# Patient Record
Sex: FEMALE | Race: WHITE | NOT HISPANIC OR LATINO | Employment: OTHER | URBAN - NONMETROPOLITAN AREA
[De-identification: names, ages, dates, MRNs, and addresses within clinical notes are randomized per-mention and may not be internally consistent; named-entity substitution may affect disease eponyms.]

---

## 2018-03-09 ENCOUNTER — TRANSCRIBE ORDERS (OUTPATIENT)
Dept: ADMINISTRATIVE | Facility: HOSPITAL | Age: 73
End: 2018-03-09

## 2018-03-09 DIAGNOSIS — Z78.0 ASYMPTOMATIC MENOPAUSAL STATE: ICD-10-CM

## 2018-03-09 DIAGNOSIS — Z12.31 ENCOUNTER FOR SCREENING MAMMOGRAM FOR MALIGNANT NEOPLASM OF BREAST: Primary | ICD-10-CM

## 2018-03-14 ENCOUNTER — HOSPITAL ENCOUNTER (OUTPATIENT)
Dept: MAMMOGRAPHY | Facility: HOSPITAL | Age: 73
Discharge: HOME | End: 2018-03-14
Attending: INTERNAL MEDICINE | Admitting: INTERNAL MEDICINE

## 2018-03-14 ENCOUNTER — HOSPITAL ENCOUNTER (OUTPATIENT)
Dept: BONE DENSITY | Facility: HOSPITAL | Age: 73
Discharge: HOME | End: 2018-03-14
Attending: INTERNAL MEDICINE

## 2018-03-14 DIAGNOSIS — Z78.0 ASYMPTOMATIC MENOPAUSAL STATE: ICD-10-CM

## 2018-03-14 DIAGNOSIS — Z12.31 ENCOUNTER FOR SCREENING MAMMOGRAM FOR MALIGNANT NEOPLASM OF BREAST: ICD-10-CM

## 2018-03-14 PROCEDURE — 77067 SCR MAMMO BI INCL CAD: CPT

## 2018-03-14 PROCEDURE — 77063 BREAST TOMOSYNTHESIS BI: CPT

## 2018-03-14 PROCEDURE — 77080 DXA BONE DENSITY AXIAL: CPT

## 2018-03-16 RX ORDER — PRAVASTATIN SODIUM 10 MG
10 TABLET ORAL DAILY
COMMUNITY

## 2018-03-16 RX ORDER — LISINOPRIL 40 MG/1
40 TABLET ORAL DAILY
COMMUNITY

## 2018-03-16 RX ORDER — TIZANIDINE HYDROCHLORIDE 4 MG/1
4 CAPSULE, GELATIN COATED ORAL 3 TIMES DAILY
COMMUNITY
End: 2020-01-15

## 2018-03-16 RX ORDER — AMLODIPINE BESYLATE 10 MG/1
10 TABLET ORAL DAILY
COMMUNITY

## 2018-03-27 ENCOUNTER — TELEPHONE (OUTPATIENT)
Dept: GASTROENTEROLOGY | Age: 73
End: 2018-03-27

## 2018-03-27 NOTE — TELEPHONE ENCOUNTER
This EP was referred to us by Dr Saritha Jackson for an Open Access Colon Screen; Called and spoke with patient regarding criteria for Open Access; Patient does qualify; Patient is scheduled for an OA Colonoscopy on 4/26/18 @ 10:15 am w/ Dr Tona Cowan at [facility]. Informed patient that they will need a  the day of the procedure as they will be sedated. Patient voiced understanding; Went over prep information and mailed Prep instructions to the patient.  Thank you for your referral. Slim mckay

## 2020-01-15 ENCOUNTER — HOSPITAL ENCOUNTER (INPATIENT)
Age: 75
LOS: 2 days | Discharge: HOME | DRG: 379 | End: 2020-01-18
Attending: EMERGENCY MEDICINE | Admitting: HOSPITALIST
Payer: MEDICARE

## 2020-01-15 LAB
ALBUMIN SERPL-MCNC: 4.3 G/DL (ref 3.5–5.2)
ALP BLD-CCNC: 63 U/L (ref 35–104)
ALT SERPL-CCNC: 11 U/L (ref 5–33)
ANION GAP SERPL CALCULATED.3IONS-SCNC: 16 MMOL/L (ref 7–19)
APTT: 25.3 SEC (ref 26–36.2)
AST SERPL-CCNC: 16 U/L (ref 5–32)
BASOPHILS ABSOLUTE: 0.1 K/UL (ref 0–0.2)
BASOPHILS RELATIVE PERCENT: 0.5 % (ref 0–1)
BILIRUB SERPL-MCNC: <0.2 MG/DL (ref 0.2–1.2)
BUN BLDV-MCNC: 35 MG/DL (ref 8–23)
CALCIUM SERPL-MCNC: 9.6 MG/DL (ref 8.8–10.2)
CHLORIDE BLD-SCNC: 100 MMOL/L (ref 98–111)
CO2: 22 MMOL/L (ref 22–29)
CREAT SERPL-MCNC: 0.7 MG/DL (ref 0.5–0.9)
EOSINOPHILS ABSOLUTE: 0.1 K/UL (ref 0–0.6)
EOSINOPHILS RELATIVE PERCENT: 0.6 % (ref 0–5)
GFR NON-AFRICAN AMERICAN: >60
GLUCOSE BLD-MCNC: 163 MG/DL (ref 74–109)
HCT VFR BLD CALC: 41.7 % (ref 37–47)
HEMOGLOBIN: 13.3 G/DL (ref 12–16)
IMMATURE GRANULOCYTES #: 0.1 K/UL
INR BLD: 1.09 (ref 0.88–1.18)
LYMPHOCYTES ABSOLUTE: 2 K/UL (ref 1.1–4.5)
LYMPHOCYTES RELATIVE PERCENT: 14.5 % (ref 20–40)
MCH RBC QN AUTO: 29.9 PG (ref 27–31)
MCHC RBC AUTO-ENTMCNC: 31.9 G/DL (ref 33–37)
MCV RBC AUTO: 93.7 FL (ref 81–99)
MONOCYTES ABSOLUTE: 0.9 K/UL (ref 0–0.9)
MONOCYTES RELATIVE PERCENT: 6.8 % (ref 0–10)
NEUTROPHILS ABSOLUTE: 10.7 K/UL (ref 1.5–7.5)
NEUTROPHILS RELATIVE PERCENT: 77.2 % (ref 50–65)
PDW BLD-RTO: 14.6 % (ref 11.5–14.5)
PLATELET # BLD: 329 K/UL (ref 130–400)
PMV BLD AUTO: 9.1 FL (ref 9.4–12.3)
POTASSIUM SERPL-SCNC: 4.9 MMOL/L (ref 3.5–5)
PROTHROMBIN TIME: 13.5 SEC (ref 12–14.6)
RBC # BLD: 4.45 M/UL (ref 4.2–5.4)
SODIUM BLD-SCNC: 138 MMOL/L (ref 136–145)
TOTAL PROTEIN: 7.2 G/DL (ref 6.6–8.7)
WBC # BLD: 13.9 K/UL (ref 4.8–10.8)

## 2020-01-15 PROCEDURE — 80053 COMPREHEN METABOLIC PANEL: CPT

## 2020-01-15 PROCEDURE — 36415 COLL VENOUS BLD VENIPUNCTURE: CPT

## 2020-01-15 PROCEDURE — 85025 COMPLETE CBC W/AUTO DIFF WBC: CPT

## 2020-01-15 PROCEDURE — 99284 EMERGENCY DEPT VISIT MOD MDM: CPT

## 2020-01-15 PROCEDURE — 85610 PROTHROMBIN TIME: CPT

## 2020-01-15 PROCEDURE — 85730 THROMBOPLASTIN TIME PARTIAL: CPT

## 2020-01-15 RX ORDER — OXYBUTYNIN CHLORIDE 5 MG/1
5 TABLET ORAL 3 TIMES DAILY
COMMUNITY

## 2020-01-16 PROBLEM — K92.2 LOWER GASTROINTESTINAL BLEED: Status: ACTIVE | Noted: 2020-01-16

## 2020-01-16 PROBLEM — Z86.718 HISTORY OF DVT (DEEP VEIN THROMBOSIS): Chronic | Status: RESOLVED | Noted: 2020-01-16 | Resolved: 2020-01-16

## 2020-01-16 PROBLEM — E78.5 HYPERLIPIDEMIA: Chronic | Status: ACTIVE | Noted: 2020-01-16

## 2020-01-16 LAB
ABO/RH: NORMAL
ANION GAP SERPL CALCULATED.3IONS-SCNC: 10 MMOL/L (ref 7–19)
ANTIBODY SCREEN: NORMAL
BASOPHILS ABSOLUTE: 0 K/UL (ref 0–0.2)
BASOPHILS RELATIVE PERCENT: 0.3 % (ref 0–1)
BUN BLDV-MCNC: 29 MG/DL (ref 8–23)
CALCIUM SERPL-MCNC: 8.8 MG/DL (ref 8.8–10.2)
CHLORIDE BLD-SCNC: 102 MMOL/L (ref 98–111)
CO2: 24 MMOL/L (ref 22–29)
CREAT SERPL-MCNC: 0.5 MG/DL (ref 0.5–0.9)
EOSINOPHILS ABSOLUTE: 0.1 K/UL (ref 0–0.6)
EOSINOPHILS RELATIVE PERCENT: 0.9 % (ref 0–5)
GFR NON-AFRICAN AMERICAN: >60
GLUCOSE BLD-MCNC: 124 MG/DL (ref 74–109)
HCT VFR BLD CALC: 32.3 % (ref 37–47)
HCT VFR BLD CALC: 32.8 % (ref 37–47)
HCT VFR BLD CALC: 34 % (ref 37–47)
HEMOGLOBIN: 10.2 G/DL (ref 12–16)
HEMOGLOBIN: 10.7 G/DL (ref 12–16)
HEMOGLOBIN: 10.9 G/DL (ref 12–16)
IMMATURE GRANULOCYTES #: 0 K/UL
LYMPHOCYTES ABSOLUTE: 2.6 K/UL (ref 1.1–4.5)
LYMPHOCYTES RELATIVE PERCENT: 27.6 % (ref 20–40)
MCH RBC QN AUTO: 29.6 PG (ref 27–31)
MCHC RBC AUTO-ENTMCNC: 32.1 G/DL (ref 33–37)
MCV RBC AUTO: 92.4 FL (ref 81–99)
MONOCYTES ABSOLUTE: 0.7 K/UL (ref 0–0.9)
MONOCYTES RELATIVE PERCENT: 7.7 % (ref 0–10)
NEUTROPHILS ABSOLUTE: 6 K/UL (ref 1.5–7.5)
NEUTROPHILS RELATIVE PERCENT: 63.2 % (ref 50–65)
PDW BLD-RTO: 14.6 % (ref 11.5–14.5)
PLATELET # BLD: 273 K/UL (ref 130–400)
PMV BLD AUTO: 9.1 FL (ref 9.4–12.3)
POTASSIUM REFLEX MAGNESIUM: 4.5 MMOL/L (ref 3.5–5)
RBC # BLD: 3.68 M/UL (ref 4.2–5.4)
SODIUM BLD-SCNC: 136 MMOL/L (ref 136–145)
WBC # BLD: 9.6 K/UL (ref 4.8–10.8)

## 2020-01-16 PROCEDURE — 80048 BASIC METABOLIC PNL TOTAL CA: CPT

## 2020-01-16 PROCEDURE — 2580000003 HC RX 258: Performed by: EMERGENCY MEDICINE

## 2020-01-16 PROCEDURE — 86901 BLOOD TYPING SEROLOGIC RH(D): CPT

## 2020-01-16 PROCEDURE — 85018 HEMOGLOBIN: CPT

## 2020-01-16 PROCEDURE — 99221 1ST HOSP IP/OBS SF/LOW 40: CPT | Performed by: INTERNAL MEDICINE

## 2020-01-16 PROCEDURE — 96361 HYDRATE IV INFUSION ADD-ON: CPT

## 2020-01-16 PROCEDURE — 86900 BLOOD TYPING SEROLOGIC ABO: CPT

## 2020-01-16 PROCEDURE — 36415 COLL VENOUS BLD VENIPUNCTURE: CPT

## 2020-01-16 PROCEDURE — 85014 HEMATOCRIT: CPT

## 2020-01-16 PROCEDURE — 96360 HYDRATION IV INFUSION INIT: CPT

## 2020-01-16 PROCEDURE — 85025 COMPLETE CBC W/AUTO DIFF WBC: CPT

## 2020-01-16 PROCEDURE — 97161 PT EVAL LOW COMPLEX 20 MIN: CPT

## 2020-01-16 PROCEDURE — 86850 RBC ANTIBODY SCREEN: CPT

## 2020-01-16 PROCEDURE — 1210000000 HC MED SURG R&B

## 2020-01-16 PROCEDURE — 6370000000 HC RX 637 (ALT 250 FOR IP): Performed by: HOSPITALIST

## 2020-01-16 PROCEDURE — 2580000003 HC RX 258: Performed by: INTERNAL MEDICINE

## 2020-01-16 RX ORDER — PRAVASTATIN SODIUM 10 MG
10 TABLET ORAL DAILY
Status: DISCONTINUED | OUTPATIENT
Start: 2020-01-16 | End: 2020-01-18 | Stop reason: HOSPADM

## 2020-01-16 RX ORDER — SODIUM CHLORIDE 0.9 % (FLUSH) 0.9 %
10 SYRINGE (ML) INJECTION EVERY 12 HOURS SCHEDULED
Status: DISCONTINUED | OUTPATIENT
Start: 2020-01-16 | End: 2020-01-18 | Stop reason: HOSPADM

## 2020-01-16 RX ORDER — OXYBUTYNIN CHLORIDE 5 MG/1
5 TABLET ORAL 3 TIMES DAILY
Status: DISCONTINUED | OUTPATIENT
Start: 2020-01-16 | End: 2020-01-18 | Stop reason: HOSPADM

## 2020-01-16 RX ORDER — SODIUM CHLORIDE 0.9 % (FLUSH) 0.9 %
10 SYRINGE (ML) INJECTION PRN
Status: DISCONTINUED | OUTPATIENT
Start: 2020-01-16 | End: 2020-01-18 | Stop reason: HOSPADM

## 2020-01-16 RX ORDER — SODIUM CHLORIDE 0.9 % (FLUSH) 0.9 %
10 SYRINGE (ML) INJECTION EVERY 12 HOURS SCHEDULED
Status: DISCONTINUED | OUTPATIENT
Start: 2020-01-16 | End: 2020-01-16 | Stop reason: SDUPTHER

## 2020-01-16 RX ORDER — ACETAMINOPHEN 325 MG/1
650 TABLET ORAL EVERY 4 HOURS PRN
Status: DISCONTINUED | OUTPATIENT
Start: 2020-01-16 | End: 2020-01-18 | Stop reason: HOSPADM

## 2020-01-16 RX ORDER — SODIUM CHLORIDE, SODIUM LACTATE, POTASSIUM CHLORIDE, CALCIUM CHLORIDE 600; 310; 30; 20 MG/100ML; MG/100ML; MG/100ML; MG/100ML
INJECTION, SOLUTION INTRAVENOUS ONCE
Status: COMPLETED | OUTPATIENT
Start: 2020-01-16 | End: 2020-01-16

## 2020-01-16 RX ORDER — ONDANSETRON 2 MG/ML
4 INJECTION INTRAMUSCULAR; INTRAVENOUS EVERY 6 HOURS PRN
Status: DISCONTINUED | OUTPATIENT
Start: 2020-01-16 | End: 2020-01-18 | Stop reason: HOSPADM

## 2020-01-16 RX ORDER — SODIUM CHLORIDE, SODIUM LACTATE, POTASSIUM CHLORIDE, CALCIUM CHLORIDE 600; 310; 30; 20 MG/100ML; MG/100ML; MG/100ML; MG/100ML
INJECTION, SOLUTION INTRAVENOUS CONTINUOUS
Status: DISCONTINUED | OUTPATIENT
Start: 2020-01-16 | End: 2020-01-18 | Stop reason: HOSPADM

## 2020-01-16 RX ORDER — SODIUM CHLORIDE 0.9 % (FLUSH) 0.9 %
10 SYRINGE (ML) INJECTION PRN
Status: DISCONTINUED | OUTPATIENT
Start: 2020-01-16 | End: 2020-01-16 | Stop reason: SDUPTHER

## 2020-01-16 RX ORDER — FLUOXETINE HYDROCHLORIDE 20 MG/1
40 CAPSULE ORAL DAILY
Status: DISCONTINUED | OUTPATIENT
Start: 2020-01-16 | End: 2020-01-18 | Stop reason: HOSPADM

## 2020-01-16 RX ADMIN — OXYBUTYNIN CHLORIDE 5 MG: 5 TABLET ORAL at 19:25

## 2020-01-16 RX ADMIN — OXYBUTYNIN CHLORIDE 5 MG: 5 TABLET ORAL at 08:53

## 2020-01-16 RX ADMIN — SODIUM CHLORIDE, PRESERVATIVE FREE 10 ML: 5 INJECTION INTRAVENOUS at 19:25

## 2020-01-16 RX ADMIN — SODIUM CHLORIDE, POTASSIUM CHLORIDE, SODIUM LACTATE AND CALCIUM CHLORIDE: 600; 310; 30; 20 INJECTION, SOLUTION INTRAVENOUS at 19:25

## 2020-01-16 RX ADMIN — SODIUM CHLORIDE, POTASSIUM CHLORIDE, SODIUM LACTATE AND CALCIUM CHLORIDE: 600; 310; 30; 20 INJECTION, SOLUTION INTRAVENOUS at 00:48

## 2020-01-16 RX ADMIN — FLUOXETINE HYDROCHLORIDE 40 MG: 20 CAPSULE ORAL at 08:54

## 2020-01-16 RX ADMIN — OXYBUTYNIN CHLORIDE 5 MG: 5 TABLET ORAL at 14:25

## 2020-01-16 RX ADMIN — PRAVASTATIN SODIUM 10 MG: 10 TABLET ORAL at 08:53

## 2020-01-16 RX ADMIN — SODIUM CHLORIDE, POTASSIUM CHLORIDE, SODIUM LACTATE AND CALCIUM CHLORIDE: 600; 310; 30; 20 INJECTION, SOLUTION INTRAVENOUS at 12:11

## 2020-01-16 ASSESSMENT — ENCOUNTER SYMPTOMS
CONSTIPATION: 0
CHEST TIGHTNESS: 0
BACK PAIN: 0
DIARRHEA: 0
TROUBLE SWALLOWING: 0
RHINORRHEA: 0
ANAL BLEEDING: 1
COUGH: 0
SHORTNESS OF BREATH: 0
ABDOMINAL DISTENTION: 0
SORE THROAT: 0
COLOR CHANGE: 0
EYE PAIN: 0
PHOTOPHOBIA: 0
ABDOMINAL PAIN: 0
VOMITING: 1
NAUSEA: 0
WHEEZING: 0

## 2020-01-16 NOTE — H&P
ADMITTED: [id number]    PCP:  Guadalupe Wood MD    CODE STATUS: Full Code  Health Care Proxy: her , Mr. Marivel Menendez, [phone]        SUBJECTIVE:    Chief Complaint   Patient presents with    Rectal Bleeding     Pt to ED wtih c/o rectal bleeding x4 hours      HPI and ROS:  Mrs. Angel Jennings is a pleasant 70year old  american lady from home. She states that she had at 5pm yesterday she went to the bath room and had req liquid for a stool. She states that between then and 8pm she went 5-8 times at home. She had two more bloody BMs in the ED. She states that it is getting better as she has only had two since 8 or 9 o'clock last night. She has been taking aleve 1-2 x a day \"for a butt muscle. \" She states \"that is all, I don't know anything else. \"  She had endorsed to the EP that she had been taking aleve in the past and had dveloped a lower GIB and was told not to use it anymore. She had started again and has been using regularly for pain. She has confirmed these details. She also endorses: intermittent dyschezia, and intermittent constipation. She also denies: fever, chills, night sweats, weakness, fatigue, malaise, dyschezia, pencils stools, dysuria, cough, and sneeze. Past Medical History:   Diagnosis Date    History of DVT (deep vein thrombosis)     Hyperlipidemia     Hypertension     Osteoarthritis     Sarcocystosis     Type II or unspecified type diabetes mellitus without mention of complication, not stated as uncontrolled      Past Psychiatric History:  Denies any    Past Surgical History:   Procedure Laterality Date    CARPAL TUNNEL RELEASE      COLONOSCOPY  2005? [facility] Roles  4/2012    Dr. Albina Sifuentes ARTHROSCOPY      KNEE SURGERY      Right total knee replacement    TONSILLECTOMY      TUBAL LIGATION      UPPER GASTROINTESTINAL ENDOSCOPY  2005?     [facility] Dr Alexx Lira Social History     Tobacco History     Smoking Status  Former Smoker Quit date  3/21/1972 Smoking Tobacco Type  Cigarettes    Smokeless Tobacco Use  Never Used          Alcohol History     Alcohol Use Status  No          Drug Use     Drug Use Status  No          Sexual Activity     Sexually Active  Not Asked            CODE STATUS: Full Code  Health Care Proxy: her , Mr. Antonina Carrillo, [phone]  AMBULATES: independently  DOMICILED: in private home, has stairs but barely ever uses, lives alone with the , has outside cats    Family History:  Deferred    Allergies:  No Known Allergies    Current Facility-Administered Medications   Medication Dose Route Frequency Provider Last Rate Last Dose    sodium chloride flush 0.9 % injection 10 mL  10 mL Intravenous 2 times per day Geneva Russell MD        sodium chloride flush 0.9 % injection 10 mL  10 mL Intravenous PRN Geneva Russell MD        acetaminophen (TYLENOL) tablet 650 mg  650 mg Oral Q4H PRN Geneva Russell MD        ondansetron [facility]) injection 4 mg  4 mg Intravenous Q6H PRN Geneva Russell MD        pravastatin (PRAVACHOL) tablet 10 mg  10 mg Oral Daily Geneva Russell MD        oxybutynin [facility]) tablet 5 mg  5 mg Oral TID Geneva Russell MD        FLUoxetine (PROZAC) capsule 40 mg  40 mg Oral Daily Geneva Russell MD        lactated ringers infusion   Intravenous Continuous Geneva Russell MD         Current Outpatient Medications   Medication Sig Dispense Refill    oxybutynin (DITROPAN) 5 MG tablet Take 5 mg by mouth 3 times daily      amLODIPine (NORVASC) 10 MG tablet Take 10 mg by mouth daily      lisinopril (PRINIVIL;ZESTRIL) 40 MG tablet Take 40 mg by mouth daily      pravastatin (PRAVACHOL) 10 MG tablet Take 10 mg by mouth daily      Ergocalciferol (VITAMIN D2 PO) Take by mouth      FLUOXETINE HCL PO Take 40 mg by mouth daily            OBJECTIVE:    Vitals:    01/16/20 0130   BP: 128/64   Pulse: 80   Resp: 20

## 2020-01-16 NOTE — ED PROVIDER NOTES
Negative for dizziness, weakness, light-headedness, numbness and headaches. Psychiatric/Behavioral: Negative for agitation, behavioral problems, confusion, hallucinations and suicidal ideas. PAST MEDICALHISTORY     Past Medical History:   Diagnosis Date    History of DVT (deep vein thrombosis)     Hyperlipidemia     Hypertension     Osteoarthritis     Sarcocystosis     Type II or unspecified type diabetes mellitus without mention of complication, not stated as uncontrolled          SURGICAL HISTORY       Past Surgical History:   Procedure Laterality Date    CARPAL TUNNEL RELEASE      COLONOSCOPY  2005? [facility] Dr Adria Kaiser  4/2012    Dr. Cleopatra Salmon ARTHROSCOPY      KNEE SURGERY      Right total knee replacement    TONSILLECTOMY      TUBAL LIGATION      UPPER GASTROINTESTINAL ENDOSCOPY  2005? [facility]  Unknown         CURRENT MEDICATIONS     Previous Medications    AMLODIPINE (NORVASC) 10 MG TABLET    Take 10 mg by mouth daily    ERGOCALCIFEROL (VITAMIN D2 PO)    Take by mouth    FLUOXETINE HCL PO    Take 40 mg by mouth daily     LISINOPRIL (PRINIVIL;ZESTRIL) 40 MG TABLET    Take 40 mg by mouth daily    OXYBUTYNIN (DITROPAN) 5 MG TABLET    Take 5 mg by mouth 3 times daily    PRAVASTATIN (PRAVACHOL) 10 MG TABLET    Take 10 mg by mouth daily       ALLERGIES     Patient has no known allergies. FAMILY HISTORY     History reviewed. No pertinent family history.        SOCIAL HISTORY       Social History     Socioeconomic History    Marital status:      Spouse name: None    Number of children: None    Years of education: None    Highest education level: None   Occupational History    None   Social Needs    Financial resource strain: None    Food insecurity:     Worry: None     Inability: None    Transportation needs:     Medical: None     Non-medical: None   Tobacco Use    Smoking status: Former

## 2020-01-16 NOTE — CONSULTS
medications    Medication Sig Start Date End Date Taking?  Authorizing Provider   oxybutynin (DITROPAN) 5 MG tablet Take 5 mg by mouth 3 times daily   Yes Historical Provider, MD   amLODIPine (NORVASC) 10 MG tablet Take 10 mg by mouth daily   Yes Historical Provider, MD   lisinopril (PRINIVIL;ZESTRIL) 40 MG tablet Take 40 mg by mouth daily   Yes Historical Provider, MD   pravastatin (PRAVACHOL) 10 MG tablet Take 10 mg by mouth daily   Yes Historical Provider, MD   Ergocalciferol (VITAMIN D2 PO) Take by mouth   Yes Historical Provider, MD   FLUOXETINE HCL PO Take 40 mg by mouth daily    Yes Historical Provider, MD      Allergies:  Nsaids      Current Meds:      sodium chloride flush  10 mL Intravenous 2 times per day    pravastatin  10 mg Oral Daily    oxybutynin  5 mg Oral TID    FLUoxetine  40 mg Oral Daily        lactated ringers         PRN Meds:  sodium chloride flush, acetaminophen, ondansetron        ROS:  ROS NEGATIVE EXCEPT THOSE MARKED WITH AN \"X\"    GENERAL: [] Fevers, [] chills, [] generalized weakness, [] weight loss, []weight gain, [] anorexia  Skin/Breast: [] jaundice, [] new rashes, [] itching   Eyes/Ears/Nose/Mouth/Throat: [] change in vision, [] double vision, [] light headiness, [] vertigo  CARDIOVASCULAR: [] chest pain, [] palpitations, [] syncope, [] dyspnea on exertion, [] orthopnea  RESPIRATORY: [] SOB, [] cough, [] wheezing, [] hemoptysis  GI: [] dark stools, [x] bloody stools, [x] BRBPR, [] abdominal pain, [] GERD like symptoms, [] nausea, [] vomiting, [] hematemesis, [] jaundice, [] constipation, [] diarrhea, [] hemorrhoids, [] change in bowel habits, [] bowel incontinence  : [] Dysuria, [] urgency, [] frequency, [] change in urine color, [] discharge  MUSCULOSKELETAL: [] muscle pain, [] muscle swelling, [] joint pain, [] muscle weakness  Neurological/Psychiatric: [] Sensory disturbances, [] motor disturbances, [] difficulty with speech, [] paresthesias, [] paralysis, [] depression, [] anxiety   Allergy/Immunological/Lymphatic/Endocrine: [x] anemia, [] rashes, [] polyuria, [] polydypsia      Physical Exam:  Vitals:    01/16/20 0026 01/16/20 0130 01/16/20 0307 01/16/20 0723   BP: 115/78 128/64 132/88 (!) 150/72   Pulse: 82 80 78 72   Resp: 20 20 20 18   Temp:   98 °F (36.7 °C) 97.3 °F (36.3 °C)   TempSrc:    Temporal   SpO2: 94% 95% 95% 92%   Weight:       Height:           Constitutional: [x] NAD, [x] of stated age, [x] well nourished  Eyes: [x] conjunctiva clear, [x] Non inflamed irises, [x] no scleral icterus  ENT/Mouth: [x]  Nares patent with pink mucosa, [x] oropharynx clear without exudates or erythema, [x] hearing grossly normal  Head/Neck: [x] symmetrical, [x] supple  Lungs: [x] respirations non labored with good effort, [x] CTA bilaterally, [x] no respiratory distress  Heart: [x] normal S1S2, [x]  Regular rate, [x]  No murmurs, [x] pedal pulses preserved 2/4 bilaterally  Abdomen: [x] +BSx4, [x] NTND, [x] soft, [x] no guarding, [x] no peritoneal signs  Muscuoskeletal: [x] Normal gait and station, [x]  Normal nails and digits bilaterally, [x] no muscle atrophy  Skin/SubQ: [x] No jaundice, [x] warm, dry skin, [x] no rashes on inspection  Neurologic: [x]  Sensation grossly intact, [x] no slurred speech, [x]  No focal deficits  Psychiatric: [x]  Orientated to person, place, and time; [x] mood and affect unremarkable, [x] memory recent and remote intact      Labs:     Recent Labs     01/15/20  2227 01/16/20  0413 01/16/20  0745   WBC 13.9* 9.6  --    RBC 4.45 3.68*  --    HGB 13.3 10.9* 10.7*   HCT 41.7 34.0* 32.8*   MCV 93.7 92.4  --    MCH 29.9 29.6  --    MCHC 31.9* 32.1*  --     273  --      Recent Labs     01/15/20  2227 01/16/20  0413    136   K 4.9 4.5   ANIONGAP 16 10    102   CO2 22 24   BUN 35* 29*   CREATININE 0.7 0.5   GLUCOSE 163* 124*   CALCIUM 9.6 8.8     No results for input(s): MG, PHOS in the last 72 hours.   Recent Labs     01/15/20  2227   AST 16   ALT 11 BILITOT <0.2   ALKPHOS 63     HgBA1c:  No components found for: HGBA1C  FLP:  No results found for: TRIG, HDL, LDLCALC, LDLDIRECT, LABVLDL  TSH:  No results found for: TSH  Troponin T: No results for input(s): TROPONINI in the last 72 hours. INR:   Recent Labs     01/15/20  2227   INR 1.09       No results for input(s): LIPASE, AMYLASE in the last 72 hours. Radiology:  No results found. Assessment:  1. Rectal bleeding  2. Anemia  3. Elevated BUN  4. NSAID use    Plan:  - Pt has a previous history of lower GI bleeding when taking NSAIDs. She was taking aleve daily for pain and yesterday began to have BRBPR. The bleeding has stopped and she is hemodynamically stable today. IV fluids decreased to 100cc/hr. Since her BUN is elevated along with NSAIDs use, would recommend both an EGD/colonoscopy tomorrow. Pt has agreed to these procedures and will place on clear liquid diet today. NPO after midnight.     Hillary Peña,

## 2020-01-17 LAB
ANION GAP SERPL CALCULATED.3IONS-SCNC: 13 MMOL/L (ref 7–19)
BASOPHILS ABSOLUTE: 0.1 K/UL (ref 0–0.2)
BASOPHILS RELATIVE PERCENT: 0.7 % (ref 0–1)
BUN BLDV-MCNC: 12 MG/DL (ref 8–23)
CALCIUM SERPL-MCNC: 8.9 MG/DL (ref 8.8–10.2)
CHLORIDE BLD-SCNC: 104 MMOL/L (ref 98–111)
CO2: 24 MMOL/L (ref 22–29)
CREAT SERPL-MCNC: <0.5 MG/DL (ref 0.5–0.9)
EOSINOPHILS ABSOLUTE: 0.1 K/UL (ref 0–0.6)
EOSINOPHILS RELATIVE PERCENT: 1.8 % (ref 0–5)
GFR NON-AFRICAN AMERICAN: >60
GLUCOSE BLD-MCNC: 91 MG/DL (ref 74–109)
HCT VFR BLD CALC: 30.8 % (ref 37–47)
HCT VFR BLD CALC: 31.1 % (ref 37–47)
HCT VFR BLD CALC: 33.7 % (ref 37–47)
HEMOGLOBIN: 10.1 G/DL (ref 12–16)
HEMOGLOBIN: 11 G/DL (ref 12–16)
HEMOGLOBIN: 9.9 G/DL (ref 12–16)
IMMATURE GRANULOCYTES #: 0 K/UL
LYMPHOCYTES ABSOLUTE: 2.7 K/UL (ref 1.1–4.5)
LYMPHOCYTES RELATIVE PERCENT: 36.3 % (ref 20–40)
MCH RBC QN AUTO: 30.2 PG (ref 27–31)
MCHC RBC AUTO-ENTMCNC: 32.5 G/DL (ref 33–37)
MCV RBC AUTO: 93.1 FL (ref 81–99)
MONOCYTES ABSOLUTE: 0.8 K/UL (ref 0–0.9)
MONOCYTES RELATIVE PERCENT: 10.2 % (ref 0–10)
NEUTROPHILS ABSOLUTE: 3.7 K/UL (ref 1.5–7.5)
NEUTROPHILS RELATIVE PERCENT: 50.7 % (ref 50–65)
PDW BLD-RTO: 14.5 % (ref 11.5–14.5)
PLATELET # BLD: 252 K/UL (ref 130–400)
PMV BLD AUTO: 9.5 FL (ref 9.4–12.3)
POTASSIUM REFLEX MAGNESIUM: 4.2 MMOL/L (ref 3.5–5)
RBC # BLD: 3.34 M/UL (ref 4.2–5.4)
SODIUM BLD-SCNC: 141 MMOL/L (ref 136–145)
WBC # BLD: 7.4 K/UL (ref 4.8–10.8)

## 2020-01-17 PROCEDURE — 2580000003 HC RX 258: Performed by: INTERNAL MEDICINE

## 2020-01-17 PROCEDURE — 85025 COMPLETE CBC W/AUTO DIFF WBC: CPT

## 2020-01-17 PROCEDURE — 6370000000 HC RX 637 (ALT 250 FOR IP): Performed by: INTERNAL MEDICINE

## 2020-01-17 PROCEDURE — 1210000000 HC MED SURG R&B

## 2020-01-17 PROCEDURE — 99233 SBSQ HOSP IP/OBS HIGH 50: CPT | Performed by: INTERNAL MEDICINE

## 2020-01-17 PROCEDURE — 85014 HEMATOCRIT: CPT

## 2020-01-17 PROCEDURE — 85018 HEMOGLOBIN: CPT

## 2020-01-17 PROCEDURE — 36415 COLL VENOUS BLD VENIPUNCTURE: CPT

## 2020-01-17 PROCEDURE — 80048 BASIC METABOLIC PNL TOTAL CA: CPT

## 2020-01-17 PROCEDURE — 6370000000 HC RX 637 (ALT 250 FOR IP): Performed by: HOSPITALIST

## 2020-01-17 RX ADMIN — OXYBUTYNIN CHLORIDE 5 MG: 5 TABLET ORAL at 09:08

## 2020-01-17 RX ADMIN — OXYBUTYNIN CHLORIDE 5 MG: 5 TABLET ORAL at 14:24

## 2020-01-17 RX ADMIN — SODIUM CHLORIDE, PRESERVATIVE FREE 10 ML: 5 INJECTION INTRAVENOUS at 21:40

## 2020-01-17 RX ADMIN — FLUOXETINE HYDROCHLORIDE 40 MG: 20 CAPSULE ORAL at 09:08

## 2020-01-17 RX ADMIN — PRAVASTATIN SODIUM 10 MG: 10 TABLET ORAL at 09:08

## 2020-01-17 RX ADMIN — POLYETHYLENE GLYCOL-3350 AND ELECTROLYTES 2000 ML: 236; 6.74; 5.86; 2.97; 22.74 POWDER, FOR SOLUTION ORAL at 17:20

## 2020-01-17 RX ADMIN — OXYBUTYNIN CHLORIDE 5 MG: 5 TABLET ORAL at 21:40

## 2020-01-17 NOTE — PROGRESS NOTES
24 hour chart check completed.     Electronically signed by Jose Alfredo Ruelas RN on 1/17/2020 at 1:20 AM
Physical Therapy    [facility] PHYSICAL THERAPY EVALUATION      Laney Hernandez    : 1945  MRN: [id number]   PHYSICIAN:  Hmoe Nj MD  Primary Problem    Patient Active Problem List   Diagnosis    Hypertension    Type II or unspecified type diabetes mellitus without mention of complication, not stated as uncontrolled    Osteoarthritis    S/P colonoscopy with polypectomy    History of colonic polyps    Lower gastrointestinal bleed    Hyperlipidemia    Anemia       Rehabilitation Diagnosis:     Lower gastrointestinal bleed [K92.2]        SERVICE DATE: 2020        SUBJECTIVE: Patient agrees that they are safe with mobility and do not require physical therapy services. OBJECTIVE:    Orientation is Within normal limits           ACTIVE ROM:       All major lower extremity joints are within functional limits      STRENGTH     Both lower extremities are grossly within functional limits.     TRANSFERS   Sit to stand   Independent      Bed to chair   Independent     Bed to mobility   Supine to sit   Independent       Roll     Independent     Scoot Side to side / Up and down   Independent    AMBULATION   Distance: Functional distances     Device: NO     Assistance: Independent       Comment:    BALANCE   Sitting    Good     Standing    Good    ASSESSMENT      Independent and safe with all functional mobility       PLAN: Discharge from skilled physical therapy      GOALS   Independent and safe with all functional mobility (MET)            Electronically signed by Digna Dubose PT on 2020 at 11:58 AM
0322 01/17/20 0655   BP: 135/74 110/62 126/61 112/65   Pulse: 69 64 71 79   Resp: 16 16 18 18   Temp: 99.4 °F (37.4 °C) 98.5 °F (36.9 °C) 98.7 °F (37.1 °C) 97.2 °F (36.2 °C)   TempSrc: Temporal Temporal Temporal Temporal   SpO2: 91% 92% 93% 95%   Weight:       Height:         24HR INTAKE/OUTPUT:      Intake/Output Summary (Last 24 hours) at 1/17/2020 1041  Last data filed at 1/17/2020 1497  Gross per 24 hour   Intake 2867.78 ml   Output 3250 ml   Net -382.22 ml     Constitutional: [x] NAD, [x] of stated age, [x] well nourished  Eyes: [x] conjunctiva clear, [x] Non inflamed irises, [x] no scleral icterus  ENT/Mouth: [x]  Nares patent with pink mucosa, [x] oropharynx clear without exudates or erythema, [x] hearing grossly normal  Head/Neck: [x] symmetrical, [x] supple  Lungs: [x] respirations non labored with good effort, [x] CTA bilaterally, [x] no respiratory distress  Heart: [x] normal S1S2, [x]  Regular rate, [x]  No murmurs, [x] pedal pulses preserved 2/4 bilaterally  Abdomen: [x] +BSx4, [x] NTND, [x] soft, [x] no guarding, [x] no peritoneal signs  Muscuoskeletal: [x] Normal gait and station, [x]  Normal nails and digits bilaterally, [x] no muscle atrophy  Skin/SubQ: [x] No jaundice, [x] warm, dry skin, [x] no rashes on inspection  Psychiatric: [x]  Orientated to person, place, and time; [x] mood and affect unremarkable, [x] memory recent and remote intact      Impression:       1. Rectal bleeding  2. Anemia  3. Elevated BUN  4. NSAID use    Plan:   - Pt remains hemodynamically stable. She was scheduled for EGD/colonsocopy, butt unfortunately did not receive the prep. Will reschedule the EGD/Colonoscopy for tomorrow. IV fluids reduce to 60cc/hr  - clear liquid diet today and NPO after midnight.  Bowel prep keven Stratton, 
sodium chloride flush  DIET CLEAR LIQUID;         Lab and other Data:     Recent Labs     01/15/20  2227 01/16/20  0413  01/17/20  0039 01/17/20  0244 01/17/20  0745   WBC 13.9* 9.6  --   --  7.4  --    HGB 13.3 10.9*   < > 9.9* 10.1* 11.0*    273  --   --  252  --     < > = values in this interval not displayed. Recent Labs     01/15/20  2227 01/16/20  0413 01/17/20  0244    136 141   K 4.9 4.5 4.2    102 104   CO2 22 24 24   BUN 35* 29* 12   CREATININE 0.7 0.5 <0.5   GLUCOSE 163* 124* 91     Recent Labs     01/15/20  2227   AST 16   ALT 11   BILITOT <0.2   ALKPHOS 63     Troponin T: No results for input(s): TROPONINI in the last 72 hours. Pro-BNP: No results for input(s): BNP in the last 72 hours. INR:   Recent Labs     01/15/20  2227   INR 1.09     ABGs: No results found for: PHART, PO2ART, BUN8TTP  UA:No results for input(s): NITRITE, COLORU, PHUR, LABCAST, WBCUA, RBCUA, MUCUS, TRICHOMONAS, YEAST, BACTERIA, CLARITYU, SPECGRAV, LEUKOCYTESUR, UROBILINOGEN, BILIRUBINUR, BLOODU, GLUCOSEU, AMORPHOUS in the last 72 hours. Invalid input(s): Rudi Baton    Imaging:   No orders to display     Micro: No results found for: BC, No components found for: LABURINE  Patient Active Problem List    Diagnosis Date Noted    Lower gastrointestinal bleed 01/16/2020    Hyperlipidemia 01/16/2020    Anemia     S/P colonoscopy with polypectomy 04/25/2012    History of colonic polyps 04/25/2012    Hypertension 03/21/2012    Type II or unspecified type diabetes mellitus without mention of complication, not stated as uncontrolled 03/21/2012    Osteoarthritis 03/21/2012       Assessment/plan: Active Problems:    Lower gastrointestinal bleed    Anemia  Resolved Problems:    * No resolved hospital problems.  *      Plan:   Recurrent Lower Gastrointestinal Bleed due to NSAID abuse  - GI consulted going for EGD/Colonoscopy in AM  -Will continue to monitor H/H  -H/H stable      Cynthia Valdez MD  Hospitalist

## 2020-01-18 ENCOUNTER — ANESTHESIA EVENT (OUTPATIENT)
Dept: ENDOSCOPY | Age: 75
DRG: 379 | End: 2020-01-18
Payer: MEDICARE

## 2020-01-18 ENCOUNTER — ANESTHESIA (OUTPATIENT)
Dept: ENDOSCOPY | Age: 75
DRG: 379 | End: 2020-01-18
Payer: MEDICARE

## 2020-01-18 VITALS
OXYGEN SATURATION: 94 % | HEART RATE: 86 BPM | HEIGHT: 59 IN | TEMPERATURE: 98.9 F | WEIGHT: 220 LBS | BODY MASS INDEX: 44.35 KG/M2 | SYSTOLIC BLOOD PRESSURE: 119 MMHG | DIASTOLIC BLOOD PRESSURE: 64 MMHG | RESPIRATION RATE: 20 BRPM

## 2020-01-18 VITALS
DIASTOLIC BLOOD PRESSURE: 51 MMHG | SYSTOLIC BLOOD PRESSURE: 97 MMHG | OXYGEN SATURATION: 96 % | RESPIRATION RATE: 16 BRPM

## 2020-01-18 LAB
ANION GAP SERPL CALCULATED.3IONS-SCNC: 16 MMOL/L (ref 7–19)
BASOPHILS ABSOLUTE: 0 K/UL (ref 0–0.2)
BASOPHILS RELATIVE PERCENT: 0.4 % (ref 0–1)
BUN BLDV-MCNC: 9 MG/DL (ref 8–23)
CALCIUM SERPL-MCNC: 9.3 MG/DL (ref 8.8–10.2)
CHLORIDE BLD-SCNC: 100 MMOL/L (ref 98–111)
CO2: 25 MMOL/L (ref 22–29)
CREAT SERPL-MCNC: 0.6 MG/DL (ref 0.5–0.9)
EOSINOPHILS ABSOLUTE: 0.1 K/UL (ref 0–0.6)
EOSINOPHILS RELATIVE PERCENT: 1.5 % (ref 0–5)
GFR NON-AFRICAN AMERICAN: >60
GLUCOSE BLD-MCNC: 138 MG/DL (ref 74–109)
HCT VFR BLD CALC: 35.3 % (ref 37–47)
HEMOGLOBIN: 11.2 G/DL (ref 12–16)
IMMATURE GRANULOCYTES #: 0 K/UL
LYMPHOCYTES ABSOLUTE: 2.3 K/UL (ref 1.1–4.5)
LYMPHOCYTES RELATIVE PERCENT: 27.3 % (ref 20–40)
MAGNESIUM: 2 MG/DL (ref 1.6–2.4)
MCH RBC QN AUTO: 29.6 PG (ref 27–31)
MCHC RBC AUTO-ENTMCNC: 31.7 G/DL (ref 33–37)
MCV RBC AUTO: 93.1 FL (ref 81–99)
MONOCYTES ABSOLUTE: 0.9 K/UL (ref 0–0.9)
MONOCYTES RELATIVE PERCENT: 10.5 % (ref 0–10)
NEUTROPHILS ABSOLUTE: 5 K/UL (ref 1.5–7.5)
NEUTROPHILS RELATIVE PERCENT: 59.9 % (ref 50–65)
PDW BLD-RTO: 14.8 % (ref 11.5–14.5)
PLATELET # BLD: 319 K/UL (ref 130–400)
PMV BLD AUTO: 8.9 FL (ref 9.4–12.3)
POTASSIUM REFLEX MAGNESIUM: 3.5 MMOL/L (ref 3.5–5)
RBC # BLD: 3.79 M/UL (ref 4.2–5.4)
SODIUM BLD-SCNC: 141 MMOL/L (ref 136–145)
WBC # BLD: 8.3 K/UL (ref 4.8–10.8)

## 2020-01-18 PROCEDURE — 85025 COMPLETE CBC W/AUTO DIFF WBC: CPT

## 2020-01-18 PROCEDURE — 7100000000 HC PACU RECOVERY - FIRST 15 MIN: Performed by: INTERNAL MEDICINE

## 2020-01-18 PROCEDURE — 3700000000 HC ANESTHESIA ATTENDED CARE: Performed by: INTERNAL MEDICINE

## 2020-01-18 PROCEDURE — 2580000003 HC RX 258: Performed by: NURSE ANESTHETIST, CERTIFIED REGISTERED

## 2020-01-18 PROCEDURE — 6360000002 HC RX W HCPCS: Performed by: NURSE ANESTHETIST, CERTIFIED REGISTERED

## 2020-01-18 PROCEDURE — 36415 COLL VENOUS BLD VENIPUNCTURE: CPT

## 2020-01-18 PROCEDURE — 0DJD8ZZ INSPECTION OF LOWER INTESTINAL TRACT, VIA NATURAL OR ARTIFICIAL OPENING ENDOSCOPIC: ICD-10-PCS | Performed by: INTERNAL MEDICINE

## 2020-01-18 PROCEDURE — 2500000003 HC RX 250 WO HCPCS: Performed by: NURSE ANESTHETIST, CERTIFIED REGISTERED

## 2020-01-18 PROCEDURE — 7100000001 HC PACU RECOVERY - ADDTL 15 MIN: Performed by: INTERNAL MEDICINE

## 2020-01-18 PROCEDURE — 2709999900 HC NON-CHARGEABLE SUPPLY: Performed by: INTERNAL MEDICINE

## 2020-01-18 PROCEDURE — 0DB78ZX EXCISION OF STOMACH, PYLORUS, VIA NATURAL OR ARTIFICIAL OPENING ENDOSCOPIC, DIAGNOSTIC: ICD-10-PCS | Performed by: INTERNAL MEDICINE

## 2020-01-18 PROCEDURE — 83735 ASSAY OF MAGNESIUM: CPT

## 2020-01-18 PROCEDURE — 2720000010 HC SURG SUPPLY STERILE: Performed by: INTERNAL MEDICINE

## 2020-01-18 PROCEDURE — 80048 BASIC METABOLIC PNL TOTAL CA: CPT

## 2020-01-18 PROCEDURE — 3700000001 HC ADD 15 MINUTES (ANESTHESIA): Performed by: INTERNAL MEDICINE

## 2020-01-18 PROCEDURE — 3609027000 HC COLONOSCOPY: Performed by: INTERNAL MEDICINE

## 2020-01-18 PROCEDURE — 43239 EGD BIOPSY SINGLE/MULTIPLE: CPT | Performed by: INTERNAL MEDICINE

## 2020-01-18 RX ORDER — ONDANSETRON 2 MG/ML
4 INJECTION INTRAMUSCULAR; INTRAVENOUS
Status: DISCONTINUED | OUTPATIENT
Start: 2020-01-18 | End: 2020-01-18 | Stop reason: HOSPADM

## 2020-01-18 RX ORDER — LIDOCAINE HYDROCHLORIDE 10 MG/ML
INJECTION, SOLUTION EPIDURAL; INFILTRATION; INTRACAUDAL; PERINEURAL PRN
Status: DISCONTINUED | OUTPATIENT
Start: 2020-01-18 | End: 2020-01-18 | Stop reason: SDUPTHER

## 2020-01-18 RX ORDER — SODIUM CHLORIDE, SODIUM LACTATE, POTASSIUM CHLORIDE, CALCIUM CHLORIDE 600; 310; 30; 20 MG/100ML; MG/100ML; MG/100ML; MG/100ML
INJECTION, SOLUTION INTRAVENOUS CONTINUOUS PRN
Status: DISCONTINUED | OUTPATIENT
Start: 2020-01-18 | End: 2020-01-18 | Stop reason: SDUPTHER

## 2020-01-18 RX ORDER — PROMETHAZINE HYDROCHLORIDE 25 MG/ML
6.25 INJECTION, SOLUTION INTRAMUSCULAR; INTRAVENOUS
Status: DISCONTINUED | OUTPATIENT
Start: 2020-01-18 | End: 2020-01-18 | Stop reason: HOSPADM

## 2020-01-18 RX ORDER — DIPHENHYDRAMINE HYDROCHLORIDE 50 MG/ML
12.5 INJECTION INTRAMUSCULAR; INTRAVENOUS
Status: DISCONTINUED | OUTPATIENT
Start: 2020-01-18 | End: 2020-01-18 | Stop reason: HOSPADM

## 2020-01-18 RX ORDER — PROPOFOL 10 MG/ML
INJECTION, EMULSION INTRAVENOUS PRN
Status: DISCONTINUED | OUTPATIENT
Start: 2020-01-18 | End: 2020-01-18 | Stop reason: SDUPTHER

## 2020-01-18 RX ADMIN — LIDOCAINE HYDROCHLORIDE 5 ML: 10 INJECTION, SOLUTION EPIDURAL; INFILTRATION; INTRACAUDAL; PERINEURAL at 08:15

## 2020-01-18 RX ADMIN — PROPOFOL 500 MG: 10 INJECTION, EMULSION INTRAVENOUS at 08:15

## 2020-01-18 RX ADMIN — SODIUM CHLORIDE, SODIUM LACTATE, POTASSIUM CHLORIDE, AND CALCIUM CHLORIDE: 600; 310; 30; 20 INJECTION, SOLUTION INTRAVENOUS at 08:13

## 2020-01-18 ASSESSMENT — PAIN SCALES - GENERAL
PAINLEVEL_OUTOF10: 0
PAINLEVEL_OUTOF10: 0

## 2020-01-18 NOTE — ANESTHESIA PRE PROCEDURE
Department of Anesthesiology  Preprocedure Note       Name:  Mateo Hills   Age:  70 y.o.  :  1945                                          MRN:  [id number]         Date:  2020      Surgeon: Pratibha Reyes):  Saima Monge MD    Procedure: COLONOSCOPY DIAGNOSTIC EGD/Colonoscopy Dr. Vicki Gutierrez 20 Anemia, rectal bleeding (Left )    Medications prior to admission:   Prior to Admission medications    Medication Sig Start Date End Date Taking?  Authorizing Provider   oxybutynin (DITROPAN) 5 MG tablet Take 5 mg by mouth 3 times daily   Yes Historical Provider, MD   amLODIPine (NORVASC) 10 MG tablet Take 10 mg by mouth daily   Yes Historical Provider, MD   lisinopril (PRINIVIL;ZESTRIL) 40 MG tablet Take 40 mg by mouth daily   Yes Historical Provider, MD   pravastatin (PRAVACHOL) 10 MG tablet Take 10 mg by mouth daily   Yes Historical Provider, MD   Ergocalciferol (VITAMIN D2 PO) Take by mouth   Yes Historical Provider, MD   FLUOXETINE HCL PO Take 40 mg by mouth daily    Yes Historical Provider, MD       Current medications:    Current Facility-Administered Medications   Medication Dose Route Frequency Provider Last Rate Last Dose    polyethylene glycol (GoLYTELY) solution 2,000 mL  2,000 mL Oral See Admin Instructions Sandra Senate, DO   2,000 mL at 20 1720    acetaminophen (TYLENOL) tablet 650 mg  650 mg Oral Q4H PRN Markie Rodrigez MD        ondansetron [facility]) injection 4 mg  4 mg Intravenous Q6H PRN Markie Rodrigez MD        pravastatin (PRAVACHOL) tablet 10 mg  10 mg Oral Daily Markie Rodrigez MD   10 mg at 20 0908    oxybutynin (DITROPAN) tablet 5 mg  5 mg Oral TID Markie Rodrigez MD   5 mg at 20 2140    FLUoxetine (PROZAC) capsule 40 mg  40 mg Oral Daily Markie Rodrigez MD   40 mg at 20 0908    lactated ringers infusion   Intravenous Continuous Carlos Chappell, DO 60 mL/hr at 20 0907      sodium chloride flush 0.9 % injection 10 mL  10 mL Intravenous 2 times per day Feng De Jesus DO   10 mL at 20 2140    sodium chloride flush 0.9 % injection 10 mL  10 mL Intravenous PRN Nadia Chappell DO           Allergies: Allergies   Allergen Reactions    Nsaids Other (See Comments)     Lower Gastrointestinal Bleeding Episodes       Problem List:    Patient Active Problem List   Diagnosis Code    Hypertension I10    Type II or unspecified type diabetes mellitus without mention of complication, not stated as uncontrolled E11.9    Osteoarthritis M19.90    S/P colonoscopy with polypectomy Z98.890    History of colonic polyps Z86.010    Lower gastrointestinal bleed K92.2    Hyperlipidemia E78.5    Anemia D64.9       Past Medical History:        Diagnosis Date    History of DVT (deep vein thrombosis)     Hyperlipidemia     Hypertension     Osteoarthritis     Sarcocystosis     Type II or unspecified type diabetes mellitus without mention of complication, not stated as uncontrolled        Past Surgical History:        Procedure Laterality Date    CARPAL TUNNEL RELEASE      COLONOSCOPY  ? [facility] Dr Wren Standing  2012    Dr. Freddie Matson ARTHROSCOPY      KNEE SURGERY      Right total knee replacement    TONSILLECTOMY      TUBAL LIGATION      UPPER GASTROINTESTINAL ENDOSCOPY  ? [facility] Dr Bianchi       Social History:    Social History     Tobacco Use    Smoking status: Former Smoker     Types: Cigarettes     Last attempt to quit: 3/21/1972     Years since quittin.8    Smokeless tobacco: Never Used   Substance Use Topics    Alcohol use:  No                                Counseling given: Not Answered      Vital Signs (Current):   Vitals:    20 1053 20 1810 20 2212 20 0709   BP: 115/61 (!) 145/72 (!) 137/54 (!) 161/76   Pulse: 76 90 76 82   Resp:    Temp: 97.4 °F (36.3 °C) 97.1 °F (36.2 °C) 97.4 °F (36.3 °C) 98.3 °F (36.8 °C)   TempSrc: Temporal Temporal Temporal Temporal   SpO2: 94% 96% 98% 96%   Weight:       Height:                                                  BP Readings from Last 3 Encounters:   01/18/20 (!) 161/76   04/25/12 137/66   03/21/12 160/82       NPO Status:                                                                                 BMI:   Wt Readings from Last 3 Encounters:   01/15/20 220 lb (99.8 kg)   04/25/12 215 lb (97.5 kg)   03/21/12 213 lb (96.6 kg)     Body mass index is 44.43 kg/m². CBC:   Lab Results   Component Value Date    WBC 8.3 01/18/2020    RBC 3.79 01/18/2020    HGB 11.2 01/18/2020    HGB 11.8 03/21/2012    HCT 35.3 01/18/2020    HCT 42.0 06/04/2012    MCV 93.1 01/18/2020    RDW 14.8 01/18/2020     01/18/2020     06/04/2012       CMP:   Lab Results   Component Value Date     01/18/2020     06/04/2012    K 3.5 01/18/2020    K 4.0 06/04/2012     01/18/2020     06/04/2012    CO2 25 01/18/2020    BUN 9 01/18/2020    CREATININE 0.6 01/18/2020    CREATININE 0.6 06/04/2012    LABGLOM >60 01/18/2020    GLUCOSE 138 01/18/2020    PROT 7.2 01/15/2020    CALCIUM 9.3 01/18/2020    BILITOT <0.2 01/15/2020    ALKPHOS 63 01/15/2020    AST 16 01/15/2020    ALT 11 01/15/2020       POC Tests: No results for input(s): POCGLU, POCNA, POCK, POCCL, POCBUN, POCHEMO, POCHCT in the last 72 hours.     Coags:   Lab Results   Component Value Date    PROTIME 13.5 01/15/2020    PROTIME 12.9 06/04/2012    INR 1.09 01/15/2020    APTT 25.3 01/15/2020       HCG (If Applicable): No results found for: PREGTESTUR, PREGSERUM, HCG, HCGQUANT     ABGs: No results found for: PHART, PO2ART, JFT1DFK, DWK9GKC, BEART, T0MGWEBB     Type & Screen (If Applicable):  No results found for: [facility]    Anesthesia Evaluation  Patient summary reviewed  Airway: Mallampati: II  TM distance: >3 FB   Neck ROM: full  Mouth opening: > = 3 FB Dental: normal exam     Comment: Poor dentition     Pulmonary:Negative Pulmonary

## 2020-01-18 NOTE — DISCHARGE SUMMARY
mass. There is no tenderness. There is no rebound and no guarding. Musculoskeletal: Normal range of motion. There is no edema or tenderness. Extremities: No edema  Neurological: Alert and oriented to person, place, and time. No cranial nerve deficit. Skin: Skin is warm and dry. No rash noted. Not diaphoretic. No erythema. No pallor. Psychiatric: Normal mood and affect. Behavior is normal. Judgment and thought content normal.     Discharge Medications:       [facility] Medication Instructions [facility]:[id number]    Printed on:01/18/20 6015   Medication Information                      amLODIPine (NORVASC) 10 MG tablet  Take 10 mg by mouth daily             Ergocalciferol (VITAMIN D2 PO)  Take by mouth             FLUOXETINE HCL PO  Take 40 mg by mouth daily              lisinopril (PRINIVIL;ZESTRIL) 40 MG tablet  Take 40 mg by mouth daily             oxybutynin (DITROPAN) 5 MG tablet  Take 5 mg by mouth 3 times daily             pravastatin (PRAVACHOL) 10 MG tablet  Take 10 mg by mouth daily                 Discharge Instructions: Follow up with Mirela Guillen MD in 5 days. Take medications as directed. Resume activity as tolerated. Diet: DIET GENERAL;     Disposition: Patient is medically stable and will be discharged to home     Time spent on discharge 30  minutes.     Signed:  Alicia Bennett MD  Hospitalist service  1/18/2020  1:18 PM

## 2020-01-18 NOTE — PROCEDURES
Endoscopic Procedure Note    Patient: Afua Lopez : 1945  [facility] Rec#: [id number] Acc#: [id number]     Primary Care Provider Nikunj Phillips MD  Referring Provider: Dr. Gab Justice    Endoscopist: Nevaeh Pereira MD    Date of Procedure:  2020    Procedure:   1. EGD w/ Bx    Indications:   1. Anemia  2. Rectal Bleeding    Anesthesia:  Sedation was administered by anesthesia who monitored the patient during the procedure. Estimated Blood Loss: minimal;  2 clips placed. Procedure:   After reviewing the patient's chart and obtaining informed consent, the patient was placed in the left lateral decubitus position. A forward-viewing Olympus endoscope was lubricated and inserted through the mouth into the oropharynx. Under direct visualization, the upper esophagus was intubated. The scope was advanced to the level of the third portion of duodenum. Scope was slowly withdrawn with careful inspection of the mucosal surfaces. The scope was retroflexed for inspection of the gastric fundus and incisura. Findings and maneuvers are listed in impression below. The patient tolerated the procedure well. The scope was removed. There were no immediate complications. Findings:   Esophagus: normal. EG junction was normal at 35 cm distal from the incisors. Stomach:  abnormal: mucosal changes of congestion, mild erythema, friability and erosions that were suggestive of gastritis noted - Gastric biopsies were taken from the antrum and insicura were taken to rule out Helicobacter pylori infection. Duodenum: normal      IMPRESSION:  1. Normal esophagus  2. Gastritis; biopsies taken, bleeding from mucosa friability led to placement of 2 hemostatic clips. 3. Normal duodenum. RECOMMENDATIONS:    1. Await path results, the patient will be contacted in 7-10 days with biopsy results. 2.  Colonoscopy today. The results were discussed with the patient and family.   A copy of the images obtained were

## 2020-01-18 NOTE — ANESTHESIA POSTPROCEDURE EVALUATION
Department of Anesthesiology  Postprocedure Note    Patient: Nain Ford  MRN: [id number]  YOB: 1945  Date of evaluation: 1/18/2020  Time:  8:43 AM     Procedure Summary     Date:  01/18/20 Room / Location:  [address]    Anesthesia Start:  Jorge [address] 23 Anesthesia Stop:  3469    Procedure:  COLONOSCOPY DIAGNOSTIC EGD/Colonoscopy Dr. Kinza Xie 1-17-20 Anemia, rectal bleeding (Left ) Diagnosis:  (anemia, rectal bleeding)    Surgeon:  Michael Carmona MD Responsible Provider:  NOEMI Black CRNA    Anesthesia Type:  general ASA Status:  2          Anesthesia Type: general    [place] Phase I:      [place] Phase II:      Last vitals: Reviewed and per EMR flowsheets.        Anesthesia Post Evaluation    Patient location during evaluation: bedside  Patient participation: complete - patient participated  Level of consciousness: sleepy but conscious  Pain score: 0  Airway patency: patent  Nausea & Vomiting: no nausea and no vomiting  Complications: no  Cardiovascular status: hemodynamically stable  Respiratory status: acceptable  Hydration status: euvolemic

## 2020-01-20 ENCOUNTER — TELEPHONE (OUTPATIENT)
Dept: GASTROENTEROLOGY | Age: 75
End: 2020-01-20

## 2020-02-03 ENCOUNTER — OFFICE VISIT (OUTPATIENT)
Dept: GASTROENTEROLOGY | Age: 75
End: 2020-02-03
Payer: MEDICARE

## 2020-02-03 ENCOUNTER — TELEPHONE (OUTPATIENT)
Dept: GASTROENTEROLOGY | Age: 75
End: 2020-02-03

## 2020-02-03 VITALS
OXYGEN SATURATION: 98 % | DIASTOLIC BLOOD PRESSURE: 72 MMHG | BODY MASS INDEX: 45.08 KG/M2 | SYSTOLIC BLOOD PRESSURE: 118 MMHG | HEIGHT: 59 IN | HEART RATE: 74 BPM | WEIGHT: 223.6 LBS

## 2020-02-03 PROCEDURE — 99213 OFFICE O/P EST LOW 20 MIN: CPT | Performed by: NURSE PRACTITIONER

## 2020-02-03 RX ORDER — M-VIT,TX,IRON,MINS/CALC/FOLIC 27MG-0.4MG
1 TABLET ORAL DAILY
COMMUNITY

## 2020-02-03 RX ORDER — LANSOPRAZOLE 30 MG/1
CAPSULE, DELAYED RELEASE ORAL
Qty: 28 CAPSULE | Refills: 0 | Status: SHIPPED | OUTPATIENT
Start: 2020-02-03 | End: 2020-02-03

## 2020-02-03 RX ORDER — LANSOPRAZOLE 30 MG/1
30 CAPSULE, DELAYED RELEASE ORAL DAILY
Qty: 30 CAPSULE | Refills: 0 | Status: SHIPPED | OUTPATIENT
Start: 2020-02-03

## 2020-02-03 RX ORDER — CLARITHROMYCIN 500 MG/1
500 TABLET, COATED ORAL 2 TIMES DAILY
Qty: 28 TABLET | Refills: 0 | Status: SHIPPED | OUTPATIENT
Start: 2020-02-03

## 2020-02-03 RX ORDER — OMEPRAZOLE 40 MG/1
CAPSULE, DELAYED RELEASE ORAL
COMMUNITY
Start: 2020-01-22

## 2020-02-03 RX ORDER — AMOXICILLIN 500 MG/1
CAPSULE ORAL
Qty: 56 CAPSULE | Refills: 0 | Status: SHIPPED | OUTPATIENT
Start: 2020-02-03

## 2020-02-03 ASSESSMENT — ENCOUNTER SYMPTOMS
VOMITING: 0
BLOOD IN STOOL: 0
SHORTNESS OF BREATH: 0
ANAL BLEEDING: 0
NAUSEA: 0
DIARRHEA: 0
CHOKING: 0
COUGH: 0
ABDOMINAL PAIN: 0
RECTAL PAIN: 0
CONSTIPATION: 0
TROUBLE SWALLOWING: 0
ABDOMINAL DISTENTION: 0

## 2020-02-03 NOTE — PROGRESS NOTES
Subjective:     Patient ID: Quincy Ontiveros is a 70 y.o. female  PCP: Catrina Carrington MD  Referring Provider: No ref. provider found    HPI  Patient presents to the office today with the following complaints: Follow-Up from Hospital (2 week had colonoscopy and endoscopy)    Pt here for hospital follow up. She had c/o BRBPR prior to admission. She reported taking NSAIDs prior to this. Hx of GI bleed on NSAIDs. Colonoscopy and EGD were performed on 1/18/2020. Today she denies any recent rectal bleeding or NSAID use. Findings: The mucosa appeared normal throughout the entire examined colon. There was moderate diverticulosis found in the descending colon, sigmoid colon, and rectosigmoid colon. A few scattered diverticula was seen in the transverse colon. Retroflexion in the rectum was normal and revealed small, non bleeding internal hemrrhoids. Recommendations:  1. Repeat colonoscopy: 7 years for screening purposes depending on health status at that time. Findings:   Esophagus: normal. EG junction was normal at 35 cm distal from the incisors. Stomach:  abnormal: mucosal changes of congestion, mild erythema, friability and erosions that were suggestive of gastritis noted - Gastric biopsies were taken from the antrum and insicura were taken to rule out Helicobacter pylori infection. Duodenum: normal  IMPRESSION:  1. Normal esophagus  2. Gastritis; biopsies taken, bleeding from mucosa friability led to placement of 2 hemostatic clips. 3. Normal duodenum. RECOMMENDATIONS:    1. Await path results, the patient will be contacted in 7-10 days with biopsy results. 2.  Colonoscopy today. FINAL DIAGNOSIS:  Stomach, gastric biopsy:  Antral-type gastric mucosa with moderate chronic active inflammation  Positive  for Helicobacter organisms  Negative for intestinal metaplasia  Negative for dysplasia     All scope and pathology reports were reviewed and discussed with patient.   All questions answered, pt verbalized understanding. This was my first time assessing Ms. Dick    Assessment:     1. Acute gastritis with hemorrhage, unspecified gastritis type    2. H. pylori infection          Plan:   - Stop Omeprazole x 2 weeks  - I will treat for H pylori with Amoxicillin, Biaxin and Prevacid BID x 2 weeks. - Restart Omeprazole after H pylori treatment  - Will need to recheck stool for H pylori in 6-8 weeks, pt instructed  - Follow up prn or sooner if needed. Orders  No orders of the defined types were placed in this encounter. Medications  Orders Placed This Encounter   Medications    amoxicillin (AMOXIL) 500 MG capsule     Si tablets  p.o. BID x 14 days for treatment of  h pylori infection     Dispense:  56 capsule     Refill:  0    clarithromycin (BIAXIN) 500 MG tablet     Sig: Take 1 tablet by mouth 2 times daily Take 1 tablet p.o. BID for 14 days for treatment of  h pylori infection. Dispense:  28 tablet     Refill:  0    lansoprazole (PREVACID) 30 MG delayed release capsule     Sig: Take 1 tablet BID (on an empty stomach) x 14 days for treatment of h pylori. Dispense:  28 capsule     Refill:  0         Patient History:     Past Medical History:   Diagnosis Date    History of DVT (deep vein thrombosis)     Hyperlipidemia     Hypertension     Osteoarthritis     Sarcocystosis     Type II or unspecified type diabetes mellitus without mention of complication, not stated as uncontrolled        Past Surgical History:   Procedure Laterality Date    CARPAL TUNNEL RELEASE      COLONOSCOPY  2005    Dr Renetta Ferraro:  GI bleed from probable diverticula.     COLONOSCOPY  2012    Dr. Renetta Ferraro:  AP,  5yr recall    COLONOSCOPY Left 2020    Dr Annabelle Villegas, non bleeding internal hemorrhoids, diverticulosis, 7 yr recall    DILATION AND CURETTAGE OF UTERUS      JOINT REPLACEMENT      KNEE ARTHROSCOPY      KNEE SURGERY      Right total knee replacement    days for treatment of  h pylori infection. 28 tablet 0    lansoprazole (PREVACID) 30 MG delayed release capsule Take 1 tablet BID (on an empty stomach) x 14 days for treatment of h pylori. 28 capsule 0    oxybutynin (DITROPAN) 5 MG tablet Take 5 mg by mouth 3 times daily      amLODIPine (NORVASC) 10 MG tablet Take 10 mg by mouth daily      lisinopril (PRINIVIL;ZESTRIL) 40 MG tablet Take 40 mg by mouth daily      pravastatin (PRAVACHOL) 10 MG tablet Take 10 mg by mouth daily      Ergocalciferol (VITAMIN D2 PO) Take by mouth      FLUOXETINE HCL PO Take 40 mg by mouth daily       omeprazole (PRILOSEC) 40 MG delayed release capsule TAKE 1 CAPSULE BY MOUTH ONCE DAILY FOR STOMACH INFLAMMATION       No current facility-administered medications for this visit. Allergies   Allergen Reactions    Nsaids Other (See Comments)     Lower Gastrointestinal Bleeding Episodes       Review of Systems   Constitutional: Negative for activity change, appetite change, fatigue, fever and unexpected weight change. HENT: Negative for trouble swallowing. Respiratory: Negative for cough, choking and shortness of breath. Cardiovascular: Negative for chest pain. Gastrointestinal: Negative for abdominal distention, abdominal pain, anal bleeding, blood in stool, constipation, diarrhea, nausea, rectal pain and vomiting. Allergic/Immunologic: Negative for food allergies. All other systems reviewed and are negative. Objective:     /72   Pulse 74   Ht 4' 11\" (1.499 m)   Wt 223 lb 9.6 oz (101.4 kg)   SpO2 98%   BMI 45.16 kg/m²     Physical Exam  Vitals signs reviewed. Constitutional:       General: She is not in acute distress. Appearance: She is well-developed. She is obese. HENT:      Head: Normocephalic and atraumatic.       Right Ear: External ear normal.      Left Ear: External ear normal.      Nose: Nose normal.   Eyes:      Conjunctiva/sclera: Conjunctivae normal.      Pupils: Pupils are equal, round, and reactive to light. Neck:      Musculoskeletal: Normal range of motion and neck supple. Cardiovascular:      Rate and Rhythm: Normal rate and regular rhythm. Heart sounds: Normal heart sounds. No murmur. No friction rub. No gallop. Pulmonary:      Effort: Pulmonary effort is normal. No respiratory distress. Breath sounds: Normal breath sounds. Abdominal:      General: Bowel sounds are normal. There is no distension. Palpations: Abdomen is soft. There is no mass. Tenderness: There is no abdominal tenderness. There is no guarding or rebound. Musculoskeletal: Normal range of motion. Skin:     General: Skin is warm and dry. Findings: No rash. Nails: There is no clubbing. Neurological:      Mental Status: She is alert and oriented to person, place, and time. Gait: Gait normal.   Psychiatric:         Behavior: Behavior normal.         Thought Content:  Thought content normal. non-reactive

## 2020-06-11 ENCOUNTER — TRANSCRIBE ORDERS (OUTPATIENT)
Dept: ADMINISTRATIVE | Facility: HOSPITAL | Age: 75
End: 2020-06-11

## 2020-06-11 DIAGNOSIS — Z12.31 ENCOUNTER FOR SCREENING MAMMOGRAM FOR MALIGNANT NEOPLASM OF BREAST: Primary | ICD-10-CM

## 2020-06-12 ENCOUNTER — HOSPITAL ENCOUNTER (OUTPATIENT)
Dept: MAMMOGRAPHY | Facility: HOSPITAL | Age: 75
Discharge: HOME | End: 2020-06-12
Admitting: INTERNAL MEDICINE

## 2020-06-12 DIAGNOSIS — Z12.31 ENCOUNTER FOR SCREENING MAMMOGRAM FOR MALIGNANT NEOPLASM OF BREAST: ICD-10-CM

## 2020-06-12 PROCEDURE — 77063 BREAST TOMOSYNTHESIS BI: CPT

## 2020-06-12 PROCEDURE — 77067 SCR MAMMO BI INCL CAD: CPT

## 2021-06-02 ENCOUNTER — TRANSCRIBE ORDERS (OUTPATIENT)
Dept: ADMINISTRATIVE | Facility: HOSPITAL | Age: 76
End: 2021-06-02

## 2021-06-02 DIAGNOSIS — Z78.0 POSTMENOPAUSAL STATUS: ICD-10-CM

## 2021-06-02 DIAGNOSIS — Z12.31 ENCOUNTER FOR SCREENING MAMMOGRAM FOR MALIGNANT NEOPLASM OF BREAST: Primary | ICD-10-CM

## 2021-06-15 ENCOUNTER — HOSPITAL ENCOUNTER (OUTPATIENT)
Dept: BONE DENSITY | Facility: HOSPITAL | Age: 76
Discharge: HOME | End: 2021-06-15

## 2021-06-15 ENCOUNTER — HOSPITAL ENCOUNTER (OUTPATIENT)
Dept: MAMMOGRAPHY | Facility: HOSPITAL | Age: 76
Discharge: HOME | End: 2021-06-15

## 2021-06-15 DIAGNOSIS — Z12.31 ENCOUNTER FOR SCREENING MAMMOGRAM FOR MALIGNANT NEOPLASM OF BREAST: ICD-10-CM

## 2021-06-15 DIAGNOSIS — Z78.0 POSTMENOPAUSAL STATUS: ICD-10-CM

## 2021-06-15 PROCEDURE — 77067 SCR MAMMO BI INCL CAD: CPT

## 2021-06-15 PROCEDURE — 77063 BREAST TOMOSYNTHESIS BI: CPT

## 2021-06-15 PROCEDURE — 77080 DXA BONE DENSITY AXIAL: CPT

## 2022-01-26 ENCOUNTER — TRANSCRIBE ORDERS (OUTPATIENT)
Dept: ADMINISTRATIVE | Facility: HOSPITAL | Age: 77
End: 2022-01-26

## 2022-01-26 DIAGNOSIS — M25.531 PAIN IN RIGHT WRIST: Primary | ICD-10-CM

## 2022-01-28 ENCOUNTER — HOSPITAL ENCOUNTER (OUTPATIENT)
Dept: ULTRASOUND IMAGING | Facility: HOSPITAL | Age: 77
Discharge: HOME | End: 2022-01-28
Admitting: INTERNAL MEDICINE

## 2022-01-28 DIAGNOSIS — M25.531 PAIN IN RIGHT WRIST: ICD-10-CM

## 2022-01-28 PROCEDURE — 76882 US LMTD JT/FCL EVL NVASC XTR: CPT

## 2022-02-07 ENCOUNTER — TRANSCRIBE ORDERS (OUTPATIENT)
Dept: ADMINISTRATIVE | Facility: HOSPITAL | Age: 77
End: 2022-02-07

## 2022-02-07 DIAGNOSIS — M25.531 PAIN IN RIGHT WRIST: Primary | ICD-10-CM

## 2022-02-11 ENCOUNTER — HOSPITAL ENCOUNTER (OUTPATIENT)
Dept: MRI IMAGING | Facility: HOSPITAL | Age: 77
Discharge: HOME | End: 2022-02-11
Admitting: FAMILY MEDICINE

## 2022-02-11 DIAGNOSIS — M25.531 PAIN IN RIGHT WRIST: ICD-10-CM

## 2022-02-11 PROCEDURE — 73221 MRI JOINT UPR EXTREM W/O DYE: CPT

## 2022-03-10 NOTE — CARE COORDINATION
Pt preworked via phone EKG ordered in [place]. Pt states she had labwork done at Dr Cowan Litter copies requested.

## 2022-03-14 ENCOUNTER — HOSPITAL ENCOUNTER (OUTPATIENT)
Dept: NON INVASIVE DIAGNOSTICS | Age: 77
Discharge: HOME | End: 2022-03-14
Payer: MEDICARE

## 2022-03-14 DIAGNOSIS — Z01.818 PRE-OP TESTING: ICD-10-CM

## 2022-03-14 LAB
EKG P AXIS: 63 DEGREES
EKG P-R INTERVAL: 158 MS
EKG Q-T INTERVAL: 388 MS
EKG QRS DURATION: 88 MS
EKG QTC CALCULATION (BAZETT): 391 MS
EKG T AXIS: 57 DEGREES

## 2022-03-14 PROCEDURE — 93005 ELECTROCARDIOGRAM TRACING: CPT | Performed by: ANESTHESIOLOGY

## 2022-03-16 ENCOUNTER — ANESTHESIA EVENT (OUTPATIENT)
Dept: OPERATING ROOM | Age: 77
End: 2022-03-16

## 2022-03-22 DIAGNOSIS — Z01.818 PRE-OP TESTING: ICD-10-CM

## 2022-03-22 LAB
BASOPHILS ABSOLUTE: 0 K/UL (ref 0–0.2)
BASOPHILS RELATIVE PERCENT: 0.4 % (ref 0–1)
EOSINOPHILS ABSOLUTE: 0.1 K/UL (ref 0–0.6)
EOSINOPHILS RELATIVE PERCENT: 2 % (ref 0–5)
HCT VFR BLD CALC: 45.4 % (ref 37–47)
HEMOGLOBIN: 14.8 G/DL (ref 12–16)
IMMATURE GRANULOCYTES #: 0 K/UL
LYMPHOCYTES ABSOLUTE: 2.3 K/UL (ref 1.1–4.5)
LYMPHOCYTES RELATIVE PERCENT: 31.9 % (ref 20–40)
MCH RBC QN AUTO: 30.3 PG (ref 27–31)
MCHC RBC AUTO-ENTMCNC: 32.6 G/DL (ref 33–37)
MCV RBC AUTO: 93 FL (ref 81–99)
MONOCYTES ABSOLUTE: 0.6 K/UL (ref 0–0.9)
MONOCYTES RELATIVE PERCENT: 8.3 % (ref 0–10)
NEUTROPHILS ABSOLUTE: 4.1 K/UL (ref 1.5–7.5)
NEUTROPHILS RELATIVE PERCENT: 57.1 % (ref 50–65)
PDW BLD-RTO: 13.4 % (ref 11.5–14.5)
PLATELET # BLD: 298 K/UL (ref 130–400)
PMV BLD AUTO: 8.7 FL (ref 9.4–12.3)
RBC # BLD: 4.88 M/UL (ref 4.2–5.4)
WBC # BLD: 7.1 K/UL (ref 4.8–10.8)

## 2022-03-23 DIAGNOSIS — Z11.52 ENCOUNTER FOR SCREENING FOR COVID-19: Primary | ICD-10-CM

## 2022-03-23 LAB — SARS-COV-2, PCR: NOT DETECTED

## 2022-03-24 ENCOUNTER — HOSPITAL ENCOUNTER (OUTPATIENT)
Age: 77
Setting detail: OUTPATIENT SURGERY
Discharge: HOME | End: 2022-03-24
Attending: ORTHOPAEDIC SURGERY | Admitting: ORTHOPAEDIC SURGERY

## 2022-03-24 ENCOUNTER — HOSPITAL ENCOUNTER (OUTPATIENT)
Age: 77
Setting detail: SPECIMEN
Discharge: HOME | End: 2022-03-24
Payer: MEDICARE

## 2022-03-24 ENCOUNTER — ANESTHESIA (OUTPATIENT)
Dept: OPERATING ROOM | Age: 77
End: 2022-03-24

## 2022-03-24 VITALS
RESPIRATION RATE: 8 BRPM | SYSTOLIC BLOOD PRESSURE: 89 MMHG | TEMPERATURE: 97.5 F | OXYGEN SATURATION: 98 % | DIASTOLIC BLOOD PRESSURE: 26 MMHG

## 2022-03-24 VITALS
DIASTOLIC BLOOD PRESSURE: 62 MMHG | RESPIRATION RATE: 18 BRPM | BODY MASS INDEX: 43.55 KG/M2 | TEMPERATURE: 98.6 F | HEART RATE: 65 BPM | HEIGHT: 59 IN | WEIGHT: 216 LBS | OXYGEN SATURATION: 96 % | SYSTOLIC BLOOD PRESSURE: 117 MMHG

## 2022-03-24 DIAGNOSIS — Z01.818 PRE-OP TESTING: Primary | ICD-10-CM

## 2022-03-24 PROBLEM — R22.31 MASS OF RIGHT WRIST: Status: ACTIVE | Noted: 2022-03-24

## 2022-03-24 PROCEDURE — 25075 EXC FOREARM LES SC < 3 CM: CPT

## 2022-03-24 PROCEDURE — 88305 TISSUE EXAM BY PATHOLOGIST: CPT

## 2022-03-24 RX ORDER — ONDANSETRON 2 MG/ML
INJECTION INTRAMUSCULAR; INTRAVENOUS PRN
Status: DISCONTINUED | OUTPATIENT
Start: 2022-03-24 | End: 2022-03-24 | Stop reason: SDUPTHER

## 2022-03-24 RX ORDER — CEFAZOLIN SODIUM 1 G/3ML
INJECTION, POWDER, FOR SOLUTION INTRAMUSCULAR; INTRAVENOUS PRN
Status: DISCONTINUED | OUTPATIENT
Start: 2022-03-24 | End: 2022-03-24 | Stop reason: SDUPTHER

## 2022-03-24 RX ORDER — DIPHENHYDRAMINE HYDROCHLORIDE 50 MG/ML
12.5 INJECTION INTRAMUSCULAR; INTRAVENOUS
Status: DISCONTINUED | OUTPATIENT
Start: 2022-03-24 | End: 2022-03-24 | Stop reason: HOSPADM

## 2022-03-24 RX ORDER — FENTANYL CITRATE 50 UG/ML
INJECTION, SOLUTION INTRAMUSCULAR; INTRAVENOUS PRN
Status: DISCONTINUED | OUTPATIENT
Start: 2022-03-24 | End: 2022-03-24 | Stop reason: SDUPTHER

## 2022-03-24 RX ORDER — SODIUM CHLORIDE 0.9 % (FLUSH) 0.9 %
5-40 SYRINGE (ML) INJECTION PRN
Status: DISCONTINUED | OUTPATIENT
Start: 2022-03-24 | End: 2022-03-24 | Stop reason: HOSPADM

## 2022-03-24 RX ORDER — SODIUM CHLORIDE 0.9 % (FLUSH) 0.9 %
5-40 SYRINGE (ML) INJECTION EVERY 12 HOURS SCHEDULED
Status: DISCONTINUED | OUTPATIENT
Start: 2022-03-24 | End: 2022-03-24 | Stop reason: HOSPADM

## 2022-03-24 RX ORDER — LIDOCAINE HYDROCHLORIDE 10 MG/ML
1 INJECTION, SOLUTION EPIDURAL; INFILTRATION; INTRACAUDAL; PERINEURAL
Status: DISCONTINUED | OUTPATIENT
Start: 2022-03-24 | End: 2022-03-24 | Stop reason: HOSPADM

## 2022-03-24 RX ORDER — MEPERIDINE HYDROCHLORIDE 25 MG/ML
12.5 INJECTION INTRAMUSCULAR; INTRAVENOUS; SUBCUTANEOUS EVERY 5 MIN PRN
Status: DISCONTINUED | OUTPATIENT
Start: 2022-03-24 | End: 2022-03-24 | Stop reason: HOSPADM

## 2022-03-24 RX ORDER — LIDOCAINE HYDROCHLORIDE AND EPINEPHRINE 10; 10 MG/ML; UG/ML
INJECTION, SOLUTION INFILTRATION; PERINEURAL PRN
Status: DISCONTINUED | OUTPATIENT
Start: 2022-03-24 | End: 2022-03-24 | Stop reason: ALTCHOICE

## 2022-03-24 RX ORDER — SODIUM CHLORIDE, SODIUM LACTATE, POTASSIUM CHLORIDE, CALCIUM CHLORIDE 600; 310; 30; 20 MG/100ML; MG/100ML; MG/100ML; MG/100ML
INJECTION, SOLUTION INTRAVENOUS CONTINUOUS
Status: DISCONTINUED | OUTPATIENT
Start: 2022-03-24 | End: 2022-03-24 | Stop reason: HOSPADM

## 2022-03-24 RX ORDER — PROPOFOL 10 MG/ML
INJECTION, EMULSION INTRAVENOUS PRN
Status: DISCONTINUED | OUTPATIENT
Start: 2022-03-24 | End: 2022-03-24 | Stop reason: SDUPTHER

## 2022-03-24 RX ORDER — LIDOCAINE HYDROCHLORIDE 10 MG/ML
INJECTION, SOLUTION INFILTRATION; PERINEURAL PRN
Status: DISCONTINUED | OUTPATIENT
Start: 2022-03-24 | End: 2022-03-24 | Stop reason: SDUPTHER

## 2022-03-24 RX ORDER — METOCLOPRAMIDE HYDROCHLORIDE 5 MG/ML
10 INJECTION INTRAMUSCULAR; INTRAVENOUS
Status: DISCONTINUED | OUTPATIENT
Start: 2022-03-24 | End: 2022-03-24 | Stop reason: HOSPADM

## 2022-03-24 RX ORDER — SODIUM CHLORIDE 9 MG/ML
25 INJECTION, SOLUTION INTRAVENOUS PRN
Status: DISCONTINUED | OUTPATIENT
Start: 2022-03-24 | End: 2022-03-24 | Stop reason: HOSPADM

## 2022-03-24 RX ADMIN — SODIUM CHLORIDE, SODIUM LACTATE, POTASSIUM CHLORIDE, CALCIUM CHLORIDE: 600; 310; 30; 20 INJECTION, SOLUTION INTRAVENOUS at 10:26

## 2022-03-24 RX ADMIN — CEFAZOLIN SODIUM 2000 MG: 1 INJECTION, POWDER, FOR SOLUTION INTRAMUSCULAR; INTRAVENOUS at 11:50

## 2022-03-24 RX ADMIN — FENTANYL CITRATE 50 MCG: 50 INJECTION, SOLUTION INTRAMUSCULAR; INTRAVENOUS at 11:53

## 2022-03-24 RX ADMIN — LIDOCAINE HYDROCHLORIDE 50 MG: 10 INJECTION, SOLUTION INFILTRATION; PERINEURAL at 11:46

## 2022-03-24 RX ADMIN — FENTANYL CITRATE 50 MCG: 50 INJECTION, SOLUTION INTRAMUSCULAR; INTRAVENOUS at 12:01

## 2022-03-24 RX ADMIN — PROPOFOL 120 MG: 10 INJECTION, EMULSION INTRAVENOUS at 11:46

## 2022-03-24 RX ADMIN — ONDANSETRON 4 MG: 2 INJECTION INTRAMUSCULAR; INTRAVENOUS at 12:01

## 2022-03-24 ASSESSMENT — PAIN SCALES - GENERAL
PAINLEVEL_OUTOF10: 0

## 2022-03-24 NOTE — ANESTHESIA POSTPROCEDURE EVALUATION
Department of Anesthesiology  Postprocedure Note    Patient: Kathy Ariza  MRN: [id number]  YOB: 1945  Date of evaluation: 3/24/2022  Time:  12:20 PM     Procedure Summary     Date: 03/24/22 Room / Location: [facility] OR  / [address] [address]    Anesthesia Start: 3669 Anesthesia Stop: 8614    Procedure: RIGHT WRIST DORSAL MASS EXCISION (Right Hand) Diagnosis: (R22.31)    Surgeons: Adamaris Bartlett MD Responsible Provider: NOEMI Frey CRNA    Anesthesia Type: general ASA Status: 3          Anesthesia Type: general    [place] Phase I:      [place] Phase II:      Last vitals: Reviewed and per EMR flowsheets.        Anesthesia Post Evaluation    Patient location during evaluation: PACU  Patient participation: complete - patient cannot participate  Level of consciousness: responsive to physical stimuli  Pain score: 0  Airway patency: patent  Nausea & Vomiting: no nausea and no vomiting  Complications: no  Cardiovascular status: hemodynamically stable  Respiratory status: acceptable, spontaneous ventilation and oral airway  Hydration status: euvolemic

## 2022-03-24 NOTE — OP NOTE
Operative Note      Patient: Irais De Santiago  YOB: 1945  MRN: [id number]    Date of Procedure: 3/24/2022    Pre-Op Diagnosis:   1. Right dorsal radial wrist mass  2. Hyperlipidemia  3. Hypertension  4. Type 2 diabetes mellitus  5. Obesity  6. Scapholunate advanced collapse right wrist    Post-Op Diagnosis:   1. Right dorsal radial wrist mass  2. Hyperlipidemia  3. Hypertension  4. Type 2 diabetes mellitus  5. Obesity  6. Scapholunate advanced collapse right wrist       Procedure:  1. Right dorsal radial wrist mass marginal excision    Surgeon(s):  Wilmer Gutiérrez MD    Assistant:   * No surgical staff found *    Anesthesia: LMA with local anesthetic    Estimated Blood Loss (mL): Minimal    Complications: None    Specimens:   1. Right wrist dorsal radial soft tissue mass     Implants: None      Drains: None    Indications: Ms. Sai Greco is a 79-year-old female who was evaluated in the clinical setting for a right dorsal radial wrist mass which had become progressively larger. Radiographic findings revealed evidence of scapholunate advanced collapse. Due to progressive enlargement of the mass and increased irritation, she elected to proceed with operative intervention. Risk, benefits and alternatives were discussed. Risks including, but not limited to, bleeding, infection, wound healing problems, failure to alleviate any or all of her preoperative symptoms, symptomatic recurrence, need for additional procedures, numbness to the dorsal radial aspect of the right hand, stiffness, weakness and adhesions were discussed. All questions were answered preoperatively. Written and informed consent were obtained. Detailed Description of Procedure:   She was met in the preoperative holding area where the correct patient, procedure and side were confirmed. The operative site was marked by myself with the patient's agreement. Consent was signed by myself.   She was transported operating room and placed supine on the operating room table. She was secured to the table and all bony prominences were padded. A formal timeout was held in which myself, the operating team and patient, again, identify the correct patient, procedure and side. General anesthesia was induced. Preoperative antibiotics were administered within 1 hour of incision. A nonsterile tourniquet was placed about the right brachium. The right upper extremity was prepped and draped in a normal sterile fashion. A skin marker was used to delineate a longitudinal incision overlying the dorsal aspect of the right wrist just ulnar to Ronnie tubercle in line with the third ray. The right upper extremity was exsanguinated with an Esmarch and the tourniquet was inflated to 250 mmHg for 33 minutes. A 15 blade scalpel was used to incise only through the skin. Blunt dissection was carried through the subcutaneous tissues and full-thickness skin flaps were elevated down to the level of the extensor retinaculum. Dissection was then carried radially and the mass was identified in the subcutaneous tissues. The mass was soft, clear and cystic-appearing in nature. Circumferential dissection was performed and the mass was traced deep to the dorsal wrist capsule. It was emanating between the ECRL and ECRB tendons. The EPL tendon was identified proximally and protected throughout the remainder of the procedure. The ECRL and ECRB tendons were retracted. The mass was then excised en bloc and sent for permanent specimen- it measured approximately 1 x 1 cm. The wound was then inspected with no residual mass remaining. The wound was irrigated with normal saline. Skin edges were approximated with nylon sutures. For postoperative pain control, approximately 5 cc of 1% lidocaine with epinephrine were injected in line with the incision.   A sterile, well-padded volar resting splint was applied to the right hand and wrist.  The tourniquet was deflated and adequate capillary refill was obtained all digits at the conclusion of the procedure. General anesthesia was reversed, she was transferred to hospital bed and moved to PACU in stable condition. All counts in the procedure were correct. She tolerated procedure well and was without intraoperative complication. Postoperative plan: She will follow up in 2 weeks for wound check and likely suture removal.  Discharge home with family. Pain medication as prescribed. Encouraged to elevate the operative extremity and perform gentle finger range of motion.     Electronically signed by Wilmer Gutiérrez MD on 3/24/2022 at 11:04 AM

## 2022-03-24 NOTE — ANESTHESIA PRE PROCEDURE
Department of Anesthesiology  Preprocedure Note       Name:  Adiel Godinez   Age:  78 y.o.  :  1945                                          MRN:  [id number]         Date:  3/24/2022      Surgeon: Cholo Anders):  France Beyer MD    Procedure: Procedure(s):  RIGHT WRIST DORSAL MASS EXCISION    Medications prior to admission:   Prior to Admission medications    Medication Sig Start Date End Date Taking? Authorizing Provider   omeprazole (PRILOSEC) 40 MG delayed release capsule TAKE 1 CAPSULE BY MOUTH ONCE DAILY FOR STOMACH INFLAMMATION 20   Historical Provider, MD   Multiple Vitamins-Minerals (THERAPEUTIC MULTIVITAMIN-MINERALS) tablet Take 1 tablet by mouth daily    Historical Provider, MD   amoxicillin (AMOXIL) 500 MG capsule 2 tablets  p.o. BID x 14 days for treatment of  h pylori infection 2/3/20   NOEMI Montenegro NP   clarithromycin (BIAXIN) 500 MG tablet Take 1 tablet by mouth 2 times daily Take 1 tablet p.o. BID for 14 days for treatment of  h pylori infection. 2/3/20   NOEMI Montenegro NP   lansoprazole (PREVACID) 30 MG delayed release capsule Take 1 capsule by mouth daily 2/3/20   NOEMI Montenegro NP   oxybutynin (DITROPAN) 5 MG tablet Take 5 mg by mouth 3 times daily    Historical Provider, MD   amLODIPine (NORVASC) 10 MG tablet Take 10 mg by mouth daily    Historical Provider, MD   lisinopril (PRINIVIL;ZESTRIL) 40 MG tablet Take 40 mg by mouth daily    Historical Provider, MD   pravastatin (PRAVACHOL) 10 MG tablet Take 10 mg by mouth daily    Historical Provider, MD   Ergocalciferol (VITAMIN D2 PO) Take by mouth    Historical Provider, MD   FLUOXETINE HCL PO Take 40 mg by mouth daily     Historical Provider, MD       Current medications:    No current facility-administered medications for this encounter. Allergies:     Allergies   Allergen Reactions    Nsaids Other (See Comments)     Lower Gastrointestinal Bleeding Episodes       Problem List:    Patient Active Problem List   Diagnosis Code    Hypertension I10    Type II or unspecified type diabetes mellitus without mention of complication, not stated as uncontrolled E11.9    Osteoarthritis M19.90    S/P colonoscopy with polypectomy Z98.890    History of colonic polyps Z86.010    Lower gastrointestinal bleed K92.2    Hyperlipidemia E78.5    Anemia D64.9       Past Medical History:        Diagnosis Date    History of DVT (deep vein thrombosis)     Hyperlipidemia     Hypertension     Osteoarthritis     Sarcocystosis     Type II or unspecified type diabetes mellitus without mention of complication, not stated as uncontrolled        Past Surgical History:        Procedure Laterality Date    CARPAL TUNNEL RELEASE      COLONOSCOPY  2005    Dr Luna Erp:  GI bleed from probable diverticula.  COLONOSCOPY  2012    Dr. Luna Erp:  AP,  5yr recall    COLONOSCOPY Left 2020    Dr Aurea Aaron, non bleeding internal hemorrhoids, diverticulosis, 7 yr recall    DILATION AND CURETTAGE OF UTERUS      JOINT REPLACEMENT      janina knees    KNEE ARTHROSCOPY      KNEE SURGERY      Right total knee replacement    TONSILLECTOMY      TUBAL LIGATION      UPPER GASTROINTESTINAL ENDOSCOPY  2005    Dr Luna Erp:  erosive antritis. Chronic problems HTN, DM, Vit D deficiency     UPPER GASTROINTESTINAL ENDOSCOPY  2020    Dr Flores Dad       Social History:    Social History     Tobacco Use    Smoking status: Former Smoker     Types: Cigarettes     Quit date: 3/21/1972     Years since quittin.0    Smokeless tobacco: Never Used   Substance Use Topics    Alcohol use:  No                                Counseling given: Not Answered      Vital Signs (Current):   Vitals:    03/10/22 1240   Weight: 220 lb (99.8 kg)   Height: 4' 11\" (1.499 m)                                              BP Readings from Last 3 Encounters:   20 118/72   20 119/64   20 (!) 97/51       NPO Status:                                                                                 BMI:   Wt Readings from Last 3 Encounters:   03/10/22 220 lb (99.8 kg)   02/03/20 223 lb 9.6 oz (101.4 kg)   01/15/20 220 lb (99.8 kg)     Body mass index is 44.43 kg/m². CBC:   Lab Results   Component Value Date    WBC 7.1 03/22/2022    RBC 4.88 03/22/2022    HGB 14.8 03/22/2022    HGB 11.8 03/21/2012    HCT 45.4 03/22/2022    HCT 42.0 06/04/2012    MCV 93.0 03/22/2022    RDW 13.4 03/22/2022     03/22/2022     06/04/2012       CMP:   Lab Results   Component Value Date     01/18/2020     06/04/2012    K 3.5 01/18/2020    K 4.0 06/04/2012     01/18/2020     06/04/2012    CO2 25 01/18/2020    BUN 9 01/18/2020    CREATININE 0.6 01/18/2020    CREATININE 0.6 06/04/2012    LABGLOM >60 01/18/2020    GLUCOSE 138 01/18/2020    PROT 7.2 01/15/2020    CALCIUM 9.3 01/18/2020    BILITOT <0.2 01/15/2020    ALKPHOS 63 01/15/2020    AST 16 01/15/2020    ALT 11 01/15/2020       POC Tests: No results for input(s): POCGLU, POCNA, POCK, POCCL, POCBUN, POCHEMO, POCHCT in the last 72 hours.     Coags:   Lab Results   Component Value Date    PROTIME 13.5 01/15/2020    PROTIME 12.9 06/04/2012    INR 1.09 01/15/2020    APTT 25.3 01/15/2020       HCG (If Applicable): No results found for: PREGTESTUR, PREGSERUM, HCG, HCGQUANT     ABGs: No results found for: PHART, PO2ART, YSS7TTD, MDR2YIB, BEART, B2CIJPFP     Type & Screen (If Applicable):  No results found for: LABABO, LABRH    Drug/Infectious Status (If Applicable):  No results found for: HIV, HEPCAB    COVID-19 Screening (If Applicable):   Lab Results   Component Value Date    COVID19 Not Detected 03/23/2022           Anesthesia Evaluation  Patient summary reviewed and Nursing notes reviewed  Airway: Mallampati: II  TM distance: >3 FB   Neck ROM: full  Mouth opening: > = 3 FB Dental:          Pulmonary:normal exam Cardiovascular:    (+) hypertension:,                   Neuro/Psych:               GI/Hepatic/Renal:             Endo/Other:    (+) Diabetes, . Abdominal:   (+) obese,           Vascular: Other Findings:             Anesthesia Plan      general     ASA 3       Induction: intravenous. Anesthetic plan and risks discussed with patient. Plan discussed with CRNA.                 NOEMI Segal - AB   3/24/2022

## 2022-07-19 ENCOUNTER — TRANSCRIBE ORDERS (OUTPATIENT)
Dept: ADMINISTRATIVE | Facility: HOSPITAL | Age: 77
End: 2022-07-19

## 2022-07-19 DIAGNOSIS — Z12.31 ENCOUNTER FOR SCREENING MAMMOGRAM FOR MALIGNANT NEOPLASM OF BREAST: Primary | ICD-10-CM

## 2022-07-19 DIAGNOSIS — Z78.0 POSTMENOPAUSAL STATUS: ICD-10-CM

## 2023-07-26 ENCOUNTER — TRANSCRIBE ORDERS (OUTPATIENT)
Dept: ADMINISTRATIVE | Facility: HOSPITAL | Age: 78
End: 2023-07-26
Payer: MEDICARE

## 2023-07-26 DIAGNOSIS — Z12.31 SCREENING MAMMOGRAM FOR BREAST CANCER: ICD-10-CM

## 2023-07-26 DIAGNOSIS — Z78.0 MENOPAUSE: Primary | ICD-10-CM

## 2023-08-10 ENCOUNTER — HOSPITAL ENCOUNTER (OUTPATIENT)
Dept: MAMMOGRAPHY | Facility: HOSPITAL | Age: 78
Discharge: HOME | End: 2023-08-10
Payer: MEDICARE

## 2023-08-10 ENCOUNTER — HOSPITAL ENCOUNTER (OUTPATIENT)
Dept: BONE DENSITY | Facility: HOSPITAL | Age: 78
Discharge: HOME | End: 2023-08-10
Payer: MEDICARE

## 2023-08-10 DIAGNOSIS — Z78.0 MENOPAUSE: ICD-10-CM

## 2023-08-10 DIAGNOSIS — Z12.31 SCREENING MAMMOGRAM FOR BREAST CANCER: ICD-10-CM

## 2023-08-10 PROCEDURE — 77063 BREAST TOMOSYNTHESIS BI: CPT

## 2023-08-10 PROCEDURE — 77080 DXA BONE DENSITY AXIAL: CPT

## 2023-08-10 PROCEDURE — 77067 SCR MAMMO BI INCL CAD: CPT

## 2024-07-26 ENCOUNTER — TRANSCRIBE ORDERS (OUTPATIENT)
Dept: ADMINISTRATIVE | Facility: HOSPITAL | Age: 79
End: 2024-07-26
Payer: MEDICARE

## 2024-07-26 DIAGNOSIS — M25.552 PAIN IN LEFT HIP: ICD-10-CM

## 2024-07-26 DIAGNOSIS — Z12.31 ENCOUNTER FOR SCREENING MAMMOGRAM FOR MALIGNANT NEOPLASM OF BREAST: Primary | ICD-10-CM

## 2024-07-29 ENCOUNTER — HOSPITAL ENCOUNTER (OUTPATIENT)
Dept: GENERAL RADIOLOGY | Facility: HOSPITAL | Age: 79
Discharge: HOME | End: 2024-07-29
Admitting: INTERNAL MEDICINE
Payer: MEDICARE

## 2024-07-29 DIAGNOSIS — M25.552 PAIN IN LEFT HIP: ICD-10-CM

## 2024-07-29 PROCEDURE — 73502 X-RAY EXAM HIP UNI 2-3 VIEWS: CPT

## 2024-08-05 LAB
NCCN CRITERIA FLAG: NORMAL
TYRER CUZICK SCORE: 1.8

## 2024-08-12 ENCOUNTER — HOSPITAL ENCOUNTER (OUTPATIENT)
Dept: MAMMOGRAPHY | Facility: HOSPITAL | Age: 79
Discharge: HOME | End: 2024-08-12
Admitting: INTERNAL MEDICINE
Payer: MEDICARE

## 2024-08-12 DIAGNOSIS — Z12.31 ENCOUNTER FOR SCREENING MAMMOGRAM FOR MALIGNANT NEOPLASM OF BREAST: ICD-10-CM

## 2024-08-12 PROCEDURE — 77063 BREAST TOMOSYNTHESIS BI: CPT

## 2024-08-12 PROCEDURE — 77067 SCR MAMMO BI INCL CAD: CPT

## 2025-06-03 ENCOUNTER — TRANSCRIBE ORDERS (OUTPATIENT)
Dept: ADMINISTRATIVE | Facility: HOSPITAL | Age: 80
End: 2025-06-03
Payer: MEDICARE

## 2025-06-03 ENCOUNTER — HOSPITAL ENCOUNTER (OUTPATIENT)
Dept: GENERAL RADIOLOGY | Facility: HOSPITAL | Age: 80
Discharge: HOME | End: 2025-06-03
Admitting: INTERNAL MEDICINE
Payer: MEDICARE

## 2025-06-03 DIAGNOSIS — M25.552 PAIN IN LEFT HIP: ICD-10-CM

## 2025-06-03 DIAGNOSIS — M25.552 PAIN IN LEFT HIP: Primary | ICD-10-CM

## 2025-06-03 PROCEDURE — 73502 X-RAY EXAM HIP UNI 2-3 VIEWS: CPT

## 2025-07-28 ENCOUNTER — TRANSCRIBE ORDERS (OUTPATIENT)
Dept: ADMINISTRATIVE | Facility: HOSPITAL | Age: 80
End: 2025-07-28
Payer: MEDICARE

## 2025-07-28 ENCOUNTER — HOSPITAL ENCOUNTER (OUTPATIENT)
Dept: GENERAL RADIOLOGY | Facility: HOSPITAL | Age: 80
Discharge: HOME | End: 2025-07-28
Payer: MEDICARE

## 2025-07-28 DIAGNOSIS — M79.89 SWELLING OF LIMB: ICD-10-CM

## 2025-07-28 DIAGNOSIS — M79.89 SWELLING OF LIMB: Primary | ICD-10-CM

## 2025-07-28 PROCEDURE — 73030 X-RAY EXAM OF SHOULDER: CPT

## (undated) DEVICE — CLIP LIG L235CM RESOL 360 BX/20

## (undated) DEVICE — 3M™ COBAN™ NL STERILE NON-LATEX SELF-ADHERENT WRAP, 2082S, 2 IN X 5 YD (5 CM X 4,5 M), 36 ROLLS/CASE: Brand: 3M™ COBAN™

## (undated) DEVICE — GLOVE ORANGE PI 8   MSG9080

## (undated) DEVICE — DRAPE,U/ SHT,SPLIT,PLAS,STERIL: Brand: MEDLINE

## (undated) DEVICE — FORCEPS BX L240CM JAW DIA2.4MM ORNG L CAP W/ NDL DISP RAD

## (undated) DEVICE — DRAPE,EXTREMITY,89X128,STERILE: Brand: MEDLINE

## (undated) DEVICE — CHLORAPREP 26ML ORANGE

## (undated) DEVICE — SUTURE ETHLN SZ 4-0 L18IN NONABSORBABLE BLK L19MM PS-2 3/8 1667H

## (undated) DEVICE — ENDO KIT,LOURDES HOSPITAL: Brand: MEDLINE INDUSTRIES, INC.

## (undated) DEVICE — MAJOR BSIN SETUP PK

## (undated) DEVICE — ELECTROSURGICAL PENCIL BUTTON SWITCH NON COATED BLADE ELECTRODE 10 FT (3 M) CORD HOLSTER: Brand: MEGADYNE

## (undated) DEVICE — DRESSING PETRO W2XL2IN 100% USP COT GZ 3% BISMUTH